# Patient Record
Sex: FEMALE | Race: WHITE | NOT HISPANIC OR LATINO | URBAN - METROPOLITAN AREA
[De-identification: names, ages, dates, MRNs, and addresses within clinical notes are randomized per-mention and may not be internally consistent; named-entity substitution may affect disease eponyms.]

---

## 2022-11-28 ENCOUNTER — APPOINTMENT (OUTPATIENT)
Dept: URBAN - METROPOLITAN AREA CLINIC 209 | Age: 67
Setting detail: DERMATOLOGY
End: 2022-11-29

## 2022-11-28 DIAGNOSIS — L20.9 ATOPIC DERMATITIS, UNSPECIFIED: ICD-10-CM

## 2022-11-28 PROBLEM — L30.9 DERMATITIS, UNSPECIFIED: Status: ACTIVE | Noted: 2022-11-28

## 2022-11-28 PROCEDURE — 99204 OFFICE O/P NEW MOD 45 MIN: CPT

## 2022-11-28 PROCEDURE — OTHER MIPS QUALITY: OTHER

## 2022-11-28 PROCEDURE — OTHER PRESCRIPTION MEDICATION MANAGEMENT: OTHER

## 2022-11-28 PROCEDURE — OTHER PRESCRIPTION: OTHER

## 2022-11-28 PROCEDURE — OTHER COUNSELING: OTHER

## 2022-11-28 PROCEDURE — OTHER DIAGNOSIS COMMENT: OTHER

## 2022-11-28 PROCEDURE — OTHER MEDICATION COUNSELING: OTHER

## 2022-11-28 RX ORDER — PERMETHRIN 50 MG/G
CREAM TOPICAL
Qty: 60 | Refills: 1 | Status: ERX | COMMUNITY
Start: 2022-11-28

## 2022-11-28 RX ORDER — PIMECROLIMUS 10 MG/G
CREAM TOPICAL
Qty: 100 | Refills: 3 | Status: ERX | COMMUNITY
Start: 2022-11-28

## 2022-11-28 RX ORDER — DOXEPIN HYDROCHLORIDE 10 MG/1
CAPSULE ORAL
Qty: 30 | Refills: 0 | Status: ERX | COMMUNITY
Start: 2022-11-28

## 2022-11-28 NOTE — PROCEDURE: PRESCRIPTION MEDICATION MANAGEMENT
Samples Given: Vanicream
Initiate Treatment: Elidel 1% cream twice daily \\ndoxepin 10 mg at bedtime for itching\\nPermethrin cream apply to neck down at night, rinse in AM, repeat in 7 days
Detail Level: Zone
Render In Strict Bullet Format?: No
Continue Regimen: Allegra daily\\nCeraVe anti-itch lotion

## 2022-11-28 NOTE — PROCEDURE: MEDICATION COUNSELING
no distress Rinvoq Counseling: I discussed with the patient the risks of Rinvoq therapy including but not limited to upper respiratory tract infections, shingles, cold sores, bronchitis, nausea, cough, fever, acne, and headache. Live vaccines should be avoided.  This medication has been linked to serious infections; higher rate of mortality; malignancy and lymphoproliferative disorders; major adverse cardiovascular events; thrombosis; thrombocytopenia, anemia, and neutropenia; lipid elevations; liver enzyme elevations; and gastrointestinal perforations.

## 2022-11-28 NOTE — PROCEDURE: DIAGNOSIS COMMENT
Comment: Discussed option of phototherapy in future if pruritus is refractory. Pt has reportedly had extensive laboratory work-up which was unrevealing. Her house is being evaluated by exterminators due to her concern about infestation.
Detail Level: Simple
Render Risk Assessment In Note?: no

## 2022-11-28 NOTE — PROCEDURE: MEDICATION COUNSELING
Xelvenuz Pregnancy And Lactation Text: This medication is Pregnancy Category D and is not considered safe during pregnancy.  The risk during breast feeding is also uncertain.

## 2022-12-27 RX ORDER — DOXEPIN HYDROCHLORIDE 10 MG/1
CAPSULE ORAL
Qty: 30 | Refills: 0 | Status: ERX

## 2023-01-19 ENCOUNTER — APPOINTMENT (OUTPATIENT)
Dept: URBAN - METROPOLITAN AREA CLINIC 262 | Age: 68
Setting detail: DERMATOLOGY
End: 2023-01-19

## 2023-01-19 DIAGNOSIS — B07.8 OTHER VIRAL WARTS: ICD-10-CM

## 2023-01-19 DIAGNOSIS — L29.8 OTHER PRURITUS: ICD-10-CM

## 2023-01-19 PROBLEM — B88.9: Status: ACTIVE | Noted: 2023-01-19

## 2023-01-19 PROCEDURE — 99203 OFFICE O/P NEW LOW 30 MIN: CPT | Mod: 25

## 2023-01-19 PROCEDURE — OTHER DEFER: OTHER

## 2023-01-19 PROCEDURE — OTHER OTHER: OTHER

## 2023-01-19 PROCEDURE — 17110 DESTRUCT B9 LESION 1-14: CPT

## 2023-01-19 PROCEDURE — OTHER PRESCRIPTION MEDICATION MANAGEMENT: OTHER

## 2023-01-19 PROCEDURE — OTHER MIPS QUALITY: OTHER

## 2023-01-19 PROCEDURE — OTHER LIQUID NITROGEN: OTHER

## 2023-01-19 PROCEDURE — OTHER PRESCRIPTION: OTHER

## 2023-01-19 PROCEDURE — OTHER COUNSELING: OTHER

## 2023-01-19 RX ORDER — LIDOCAINE 5 G/100G
CREAM RECTAL; TOPICAL
Qty: 30 | Refills: 0 | Status: ERX | COMMUNITY
Start: 2023-01-19

## 2023-01-19 RX ORDER — DOXEPIN HYDROCHLORIDE 10 MG/1
CAPSULE ORAL
Qty: 30 | Refills: 2 | Status: ERX | COMMUNITY
Start: 2023-01-19

## 2023-01-19 ASSESSMENT — LOCATION DETAILED DESCRIPTION DERM
LOCATION DETAILED: LEFT VENTRAL DISTAL FOREARM
LOCATION DETAILED: RIGHT DISTAL RADIAL THUMB

## 2023-01-19 ASSESSMENT — LOCATION SIMPLE DESCRIPTION DERM
LOCATION SIMPLE: LEFT FOREARM
LOCATION SIMPLE: RIGHT THUMB

## 2023-01-19 ASSESSMENT — LOCATION ZONE DERM
LOCATION ZONE: FINGER
LOCATION ZONE: ARM

## 2023-01-19 NOTE — PROCEDURE: DEFER
Introduction Text (Please End With A Colon): The following procedure was deferred: liquid nitrogen therapy
Detail Level: Detailed
X Size Of Lesion In Cm (Optional): 0

## 2023-01-19 NOTE — PROCEDURE: PRESCRIPTION MEDICATION MANAGEMENT
Detail Level: Zone
Render In Strict Bullet Format?: No
Initiate Treatment: 5-FU/ SA Paste from skin [employer]. Apply nightly to warts on right thumb and left forearm

## 2023-01-19 NOTE — PROCEDURE: COUNSELING
Detail Level: Detailed
Patient Specific Counseling (Will Not Stick From Patient To Patient): Patient has been counseled by other providers and is up to date with age-appropriate malignancy screening and has been evaluated previously with laboratory testing and no other obvious causes apart from documented prior infestation. Pruritus is improving, but still active.

## 2023-01-19 NOTE — PROCEDURE: OTHER
Other (Free Text): Previously concerned about arthropod infestation, because of which she has called exterminators and is cleaning her house. Associated pruritus is improved, but persistent since that episode.
Note Text (......Xxx Chief Complaint.): This diagnosis correlates with the
Detail Level: Zone
Render Risk Assessment In Note?: no

## 2023-01-19 NOTE — PROCEDURE: LIQUID NITROGEN
Render Post-Care Instructions In Note?: no
Show Aperture Variable?: Yes
Consent: The patient's consent was obtained including but not limited to risks of crusting, scabbing, blistering, scarring, darker or lighter pigmentary change, recurrence, incomplete removal and infection.
Post-Care Instructions: I reviewed with the patient in detail post-care instructions. Patient is to wear sunprotection, and avoid picking at any of the treated lesions. Pt may apply Vaseline to crusted or scabbing areas.
Spray Paint Text: The liquid nitrogen was applied to the skin utilizing a spray paint frosting technique.
Medical Necessity Clause: This procedure was medically necessary because the lesions that were treated were:
Detail Level: Detailed
Medical Necessity Information: It is in your best interest to select a reason for this procedure from the list below. All of these items fulfill various CMS LCD requirements except the new and changing color options.

## 2023-03-23 ENCOUNTER — APPOINTMENT (OUTPATIENT)
Dept: URBAN - METROPOLITAN AREA CLINIC 262 | Age: 68
Setting detail: DERMATOLOGY
End: 2023-03-23

## 2023-03-23 DIAGNOSIS — B07.8 OTHER VIRAL WARTS: ICD-10-CM

## 2023-03-23 DIAGNOSIS — L82.0 INFLAMED SEBORRHEIC KERATOSIS: ICD-10-CM

## 2023-03-23 DIAGNOSIS — I78.8 OTHER DISEASES OF CAPILLARIES: ICD-10-CM

## 2023-03-23 DIAGNOSIS — L259 CONTACT DERMATITIS AND OTHER ECZEMA, UNSPECIFIED CAUSE: ICD-10-CM

## 2023-03-23 DIAGNOSIS — T63.30 TOXIC EFFECT OF UNSPECIFIED SPIDER VENOM: ICD-10-CM

## 2023-03-23 DIAGNOSIS — B35.1 TINEA UNGUIUM: ICD-10-CM

## 2023-03-23 PROBLEM — T63.301A TOXIC EFFECT OF UNSPECIFIED SPIDER VENOM, ACCIDENTAL (UNINTENTIONAL), INITIAL ENCOUNTER: Status: ACTIVE | Noted: 2023-03-23

## 2023-03-23 PROBLEM — L23.9 ALLERGIC CONTACT DERMATITIS, UNSPECIFIED CAUSE: Status: ACTIVE | Noted: 2023-03-23

## 2023-03-23 PROCEDURE — OTHER PRESCRIPTION: OTHER

## 2023-03-23 PROCEDURE — OTHER DEFER: OTHER

## 2023-03-23 PROCEDURE — OTHER COUNSELING: OTHER

## 2023-03-23 PROCEDURE — 99214 OFFICE O/P EST MOD 30 MIN: CPT | Mod: 25

## 2023-03-23 PROCEDURE — 17110 DESTRUCT B9 LESION 1-14: CPT

## 2023-03-23 PROCEDURE — OTHER LIQUID NITROGEN: OTHER

## 2023-03-23 PROCEDURE — OTHER ADDITIONAL NOTES: OTHER

## 2023-03-23 RX ORDER — TACROLIMUS 1 MG/G
OINTMENT TOPICAL BID
Qty: 60 | Refills: 3 | Status: ERX | COMMUNITY
Start: 2023-03-23

## 2023-03-23 RX ORDER — FLUCONAZOLE 150 MG/1
TABLET ORAL
Qty: 27 | Refills: 0 | Status: ERX | COMMUNITY
Start: 2023-03-23

## 2023-03-23 ASSESSMENT — LOCATION DETAILED DESCRIPTION DERM
LOCATION DETAILED: RIGHT PLANTAR FOREFOOT OVERLYING 4TH METATARSAL
LOCATION DETAILED: LEFT DISTAL PLANTAR 5TH TOE
LOCATION DETAILED: LEFT DISTAL PRETIBIAL REGION
LOCATION DETAILED: RIGHT PROXIMAL PRETIBIAL REGION
LOCATION DETAILED: LEFT PROXIMAL PRETIBIAL REGION
LOCATION DETAILED: LEFT DISTAL PLANTAR 2ND TOE
LOCATION DETAILED: LEFT ANTERIOR MEDIAL MALLEOLUS
LOCATION DETAILED: LEFT DORSAL GREAT TOE
LOCATION DETAILED: LEFT 3RD TOENAIL
LOCATION DETAILED: LEFT DISTAL PLANTAR 4TH TOE

## 2023-03-23 ASSESSMENT — LOCATION SIMPLE DESCRIPTION DERM
LOCATION SIMPLE: LEFT 5TH TOE
LOCATION SIMPLE: LEFT ANKLE
LOCATION SIMPLE: LEFT GREAT TOE
LOCATION SIMPLE: RIGHT PLANTAR SURFACE
LOCATION SIMPLE: LEFT 4TH TOE
LOCATION SIMPLE: LEFT PRETIBIAL REGION
LOCATION SIMPLE: RIGHT PRETIBIAL REGION
LOCATION SIMPLE: LEFT 3RD TOE
LOCATION SIMPLE: LEFT 2ND TOE

## 2023-03-23 ASSESSMENT — LOCATION ZONE DERM
LOCATION ZONE: FEET
LOCATION ZONE: TOENAIL
LOCATION ZONE: LEG
LOCATION ZONE: TOE

## 2023-03-23 NOTE — PROCEDURE: COUNSELING
Detail Level: Detailed
Patient Specific Counseling (Will Not Stick From Patient To Patient): Pt concerned for contact dermatitis to class B/D topical steroids including TAC; will discontinue in favor of Protopic

## 2023-03-23 NOTE — PROCEDURE: LIQUID NITROGEN
Medical Necessity Clause: This procedure was medically necessary because the lesions that were treated were:
Spray Paint Text: The liquid nitrogen was applied to the skin utilizing a spray paint frosting technique.
Render Note In Bullet Format When Appropriate: No
Show Aperture Variable?: Yes
Consent: The patient's consent was obtained including but not limited to risks of crusting, scabbing, blistering, scarring, darker or lighter pigmentary change, recurrence, incomplete removal and infection.
Detail Level: Detailed
Medical Necessity Information: It is in your best interest to select a reason for this procedure from the list below. All of these items fulfill various CMS LCD requirements except the new and changing color options.
Post-Care Instructions: I reviewed with the patient in detail post-care instructions. Patient is to wear sunprotection, and avoid picking at any of the treated lesions. Pt may apply Vaseline to crusted or scabbing areas.

## 2023-03-23 NOTE — PROCEDURE: ADDITIONAL NOTES
Additional Notes: Recommended Protopic as well as compression stockings
Additional Notes: Diagnosed and started on doxycycline by OSH; provided with prescription samples enough to complete a full ten-day course
Render Risk Assessment In Note?: no
Detail Level: Simple

## 2023-03-23 NOTE — PROCEDURE: DEFER
Size Of Lesion In Cm (Optional): 0
Introduction Text (Please End With A Colon): Declines treatment with cryotherapy.
Detail Level: Detailed

## 2023-04-11 ENCOUNTER — RX ONLY (RX ONLY)
Age: 68
End: 2023-04-11

## 2023-04-11 RX ORDER — DOXEPIN HYDROCHLORIDE 25 MG/1
CAPSULE ORAL
Qty: 90 | Refills: 0 | Status: ERX | COMMUNITY
Start: 2023-04-11

## 2023-06-20 ENCOUNTER — APPOINTMENT (OUTPATIENT)
Dept: URBAN - METROPOLITAN AREA CLINIC 295 | Age: 68
Setting detail: DERMATOLOGY
End: 2023-06-20

## 2023-06-20 DIAGNOSIS — B07.8 OTHER VIRAL WARTS: ICD-10-CM

## 2023-06-20 DIAGNOSIS — L50.1 IDIOPATHIC URTICARIA: ICD-10-CM

## 2023-06-20 DIAGNOSIS — L20.84 INTRINSIC (ALLERGIC) ECZEMA: ICD-10-CM

## 2023-06-20 PROBLEM — L30.9 DERMATITIS, UNSPECIFIED: Status: ACTIVE | Noted: 2023-06-20

## 2023-06-20 PROCEDURE — OTHER COUNSELING: OTHER

## 2023-06-20 PROCEDURE — OTHER LIQUID NITROGEN: OTHER

## 2023-06-20 PROCEDURE — 17110 DESTRUCT B9 LESION 1-14: CPT

## 2023-06-20 PROCEDURE — OTHER PRESCRIPTION: OTHER

## 2023-06-20 PROCEDURE — 99214 OFFICE O/P EST MOD 30 MIN: CPT | Mod: 25

## 2023-06-20 PROCEDURE — OTHER ADDITIONAL NOTES: OTHER

## 2023-06-20 PROCEDURE — OTHER MIPS QUALITY: OTHER

## 2023-06-20 RX ORDER — LEVOCETIRIZINE DIHYDROCHLORIDE 5 MG/1
TABLET, FILM COATED ORAL
Qty: 90 | Refills: 2 | Status: ERX | COMMUNITY
Start: 2023-06-20

## 2023-06-20 RX ORDER — TACROLIMUS 1 MG/G
OINTMENT TOPICAL
Qty: 100 | Refills: 3 | Status: ERX | COMMUNITY
Start: 2023-06-20

## 2023-06-20 ASSESSMENT — LOCATION ZONE DERM
LOCATION ZONE: FEET
LOCATION ZONE: NECK
LOCATION ZONE: FINGER

## 2023-06-20 ASSESSMENT — LOCATION DETAILED DESCRIPTION DERM
LOCATION DETAILED: LEFT LATERAL PLANTAR HEEL
LOCATION DETAILED: RIGHT DISTAL RADIAL THUMB
LOCATION DETAILED: LEFT MEDIAL PLANTAR HEEL
LOCATION DETAILED: LEFT INFERIOR LATERAL NECK

## 2023-06-20 ASSESSMENT — LOCATION SIMPLE DESCRIPTION DERM
LOCATION SIMPLE: LEFT ANTERIOR NECK
LOCATION SIMPLE: RIGHT THUMB
LOCATION SIMPLE: LEFT PLANTAR SURFACE

## 2023-06-20 NOTE — PROCEDURE: LIQUID NITROGEN
Show Topical Anesthesia Variable?: Yes
Add 52 Modifier (Optional): no
Spray Paint Text: The liquid nitrogen was applied to the skin utilizing a spray paint frosting technique.
Medical Necessity Information: It is in your best interest to select a reason for this procedure from the list below. All of these items fulfill various CMS LCD requirements except the new and changing color options.
Medical Necessity Clause: This procedure was medically necessary because the lesions that were treated were:
Consent: The patient's consent was obtained including but not limited to risks of crusting, scabbing, blistering, scarring, darker or lighter pigmentary change, recurrence, incomplete removal and infection.
Post-Care Instructions: I reviewed with the patient in detail post-care instructions. Patient is to wear sunprotection, and avoid picking at any of the treated lesions. Pt may apply Vaseline to crusted or scabbing areas.
Detail Level: Detailed

## 2023-06-20 NOTE — PROCEDURE: ADDITIONAL NOTES
Render Risk Assessment In Note?: no
Detail Level: Simple
Additional Notes: Possibly worsened due to mites/arthropods vs airborne contact allergen from air ducts, as areas of body covered by clothing are spared from involvement, and even carrying an umbrella under her air vents seems to reduce her itching/rash. \\n\\nPatient may have contact allergy to class B and D topical steroids, as she reports that all of her rashes worsen. She also reacts adversely to prednisone.

## 2023-07-20 ENCOUNTER — APPOINTMENT (OUTPATIENT)
Dept: URBAN - METROPOLITAN AREA CLINIC 295 | Age: 68
Setting detail: DERMATOLOGY
End: 2023-07-20

## 2023-07-20 ENCOUNTER — RX ONLY (RX ONLY)
Age: 68
End: 2023-07-20

## 2023-07-20 DIAGNOSIS — L82.1 OTHER SEBORRHEIC KERATOSIS: ICD-10-CM

## 2023-07-20 DIAGNOSIS — D18.0 HEMANGIOMA: ICD-10-CM

## 2023-07-20 DIAGNOSIS — L57.8 OTHER SKIN CHANGES DUE TO CHRONIC EXPOSURE TO NONIONIZING RADIATION: ICD-10-CM

## 2023-07-20 DIAGNOSIS — B07.8 OTHER VIRAL WARTS: ICD-10-CM

## 2023-07-20 DIAGNOSIS — D22 MELANOCYTIC NEVI: ICD-10-CM

## 2023-07-20 DIAGNOSIS — Z71.89 OTHER SPECIFIED COUNSELING: ICD-10-CM

## 2023-07-20 DIAGNOSIS — L81.4 OTHER MELANIN HYPERPIGMENTATION: ICD-10-CM

## 2023-07-20 PROBLEM — D18.01 HEMANGIOMA OF SKIN AND SUBCUTANEOUS TISSUE: Status: ACTIVE | Noted: 2023-07-20

## 2023-07-20 PROBLEM — D22.5 MELANOCYTIC NEVI OF TRUNK: Status: ACTIVE | Noted: 2023-07-20

## 2023-07-20 PROCEDURE — OTHER MIPS QUALITY: OTHER

## 2023-07-20 PROCEDURE — 17110 DESTRUCT B9 LESION 1-14: CPT

## 2023-07-20 PROCEDURE — OTHER SUNSCREEN RECOMMENDATIONS: OTHER

## 2023-07-20 PROCEDURE — OTHER LIQUID NITROGEN: OTHER

## 2023-07-20 PROCEDURE — OTHER COUNSELING: OTHER

## 2023-07-20 PROCEDURE — 99213 OFFICE O/P EST LOW 20 MIN: CPT | Mod: 25

## 2023-07-20 RX ORDER — LIDOCAINE 5 G/100G
CREAM RECTAL; TOPICAL
Qty: 30 | Refills: 3 | Status: ERX | COMMUNITY
Start: 2023-07-20

## 2023-07-20 ASSESSMENT — LOCATION DETAILED DESCRIPTION DERM
LOCATION DETAILED: RIGHT PROXIMAL DORSAL THUMB
LOCATION DETAILED: EPIGASTRIC SKIN
LOCATION DETAILED: INFERIOR MID FOREHEAD
LOCATION DETAILED: LEFT MID-UPPER BACK
LOCATION DETAILED: RIGHT DISTAL DORSAL FOREARM
LOCATION DETAILED: LEFT DISTAL DORSAL FOREARM
LOCATION DETAILED: LEFT LATERAL PLANTAR HEEL

## 2023-07-20 ASSESSMENT — LOCATION SIMPLE DESCRIPTION DERM
LOCATION SIMPLE: RIGHT THUMB
LOCATION SIMPLE: RIGHT FOREARM
LOCATION SIMPLE: ABDOMEN
LOCATION SIMPLE: LEFT FOREARM
LOCATION SIMPLE: INFERIOR FOREHEAD
LOCATION SIMPLE: LEFT UPPER BACK
LOCATION SIMPLE: LEFT PLANTAR SURFACE

## 2023-07-20 ASSESSMENT — LOCATION ZONE DERM
LOCATION ZONE: TRUNK
LOCATION ZONE: FINGER
LOCATION ZONE: FEET
LOCATION ZONE: ARM
LOCATION ZONE: FACE

## 2023-07-20 NOTE — PROCEDURE: LIQUID NITROGEN
Spray Paint Technique: No
Spray Paint Text: The liquid nitrogen was applied to the skin utilizing a spray paint frosting technique.
Show Applicator Variable?: Yes
Medical Necessity Information: It is in your best interest to select a reason for this procedure from the list below. All of these items fulfill various CMS LCD requirements except the new and changing color options.
Medical Necessity Clause: This procedure was medically necessary because the lesions that were treated were:
Post-Care Instructions: I reviewed with the patient in detail post-care instructions. Patient is to wear sunprotection, and avoid picking at any of the treated lesions. Pt may apply Vaseline to crusted or scabbing areas.
Detail Level: Detailed
Consent: The patient's consent was obtained including but not limited to risks of crusting, scabbing, blistering, scarring, darker or lighter pigmentary change, recurrence, incomplete removal and infection.

## 2023-07-20 NOTE — PROCEDURE: COUNSELING
Detail Level: Generalized
Detail Level: Detailed
Sunscreen Recommendations: I recommend a broad-spectrum sunscreen with an SPF of 30 or higher. SPF 30 sunscreens block approximately 97 percent of the sun's harmful rays. Sunscreens should be applied at least 15 minutes prior to expected sun exposure and then every two hours after that at a minimum (or more frequently if it states in the packaging) as long as sun exposure continues. If swimming or exercising sunscreen should be reapplied every 45 minutes to an hour after getting wet or sweating. One ounce, or the equivalent of a shot glass full of sunscreen, is adequate to protect the skin of the entire body not covered by a bathing suit. I also recommend a lip balm with SPF 30 as well. Mineral-based sunscreens may be tolerated better in patients with sensitive skin and are recommended for anyone with a history of rosacea and/or melasma. Sun-protective clothing (rash guards, sun sleeves, gloves, sunglasses) can be used in lieu of sunscreen but must be worn the entire time you are exposed to the sun's rays.\\n\\nBrands of sunscreen that I like include CeraVe, La Roche-Posay Anthelios, Cetaphil, Vanicream, Neutrogena, Eucerin, Pipette, Blue Lizard, Elta MD, TIZO, Charan, Violet's Choice, Drunk Elephant, Supergoop!, Coola, ALIREZA, Cotz Plus, Avene, Adelina, ZO Skin Health, Obagi MD, FARRUKH, Skinceuticals

## 2023-07-20 NOTE — PROCEDURE: SUNSCREEN RECOMMENDATIONS
Detail Level: Detailed
General Sunscreen Counseling: I recommend a broad-spectrum sunscreen with a SPF of 30 or higher.  SPF 30 sunscreens block approximately 97 percent of the sun's harmful rays.  Sunscreens should be applied at least 15 minutes prior to expected sun exposure and then every two hours after that as long as sun exposure continues. If swimming or exercising sunscreen should be reapplied every 45 minutes to an hour after getting wet or sweating.  One ounce, or the equivalent of a shot glass full of sunscreen, is adequate to protect the skin not covered by a bathing suit. I also recommend a lip balm with at least SPF 30 as well. Sun protective clothing can be used in lieu of sunscreen but must be worn the entire time you are exposed to the sun's rays.\\n\\nCeraVe, La Roche-Posay Anthelios, Cetaphil, Vanicream, Pipette, Neutrogena, Blue Lizard, Elta MD, TIJORGE L, Charan, Violet's Choice, Drunk Elephant, Supergoop!, Coola, ALIREZA, Cotz Plus, ISDIN, Skinceuticals, Sente, ZO Skin Health, Obagi MD, Avene

## 2023-08-09 ENCOUNTER — RX ONLY (RX ONLY)
Age: 68
End: 2023-08-09

## 2023-08-09 RX ORDER — LIDOCAINE 50 MG/G
OINTMENT TOPICAL
Qty: 50 | Refills: 3 | Status: ERX | COMMUNITY
Start: 2023-08-09

## 2023-08-23 ENCOUNTER — APPOINTMENT (OUTPATIENT)
Dept: URBAN - METROPOLITAN AREA CLINIC 295 | Age: 68
Setting detail: DERMATOLOGY
End: 2023-08-23

## 2023-08-23 DIAGNOSIS — B07.8 OTHER VIRAL WARTS: ICD-10-CM

## 2023-08-23 DIAGNOSIS — L29.8 OTHER PRURITUS: ICD-10-CM

## 2023-08-23 DIAGNOSIS — B85.0 PEDICULOSIS DUE TO PEDICULUS HUMANUS CAPITIS: ICD-10-CM

## 2023-08-23 PROBLEM — L85.0 ACQUIRED ICHTHYOSIS: Status: ACTIVE | Noted: 2023-08-23

## 2023-08-23 PROCEDURE — OTHER LIQUID NITROGEN: OTHER

## 2023-08-23 PROCEDURE — OTHER ADDITIONAL NOTES: OTHER

## 2023-08-23 PROCEDURE — 17110 DESTRUCT B9 LESION 1-14: CPT

## 2023-08-23 PROCEDURE — OTHER COUNSELING: OTHER

## 2023-08-23 PROCEDURE — OTHER PRESCRIPTION: OTHER

## 2023-08-23 PROCEDURE — OTHER DIAGNOSIS COMMENT: OTHER

## 2023-08-23 PROCEDURE — OTHER MIPS QUALITY: OTHER

## 2023-08-23 PROCEDURE — 99214 OFFICE O/P EST MOD 30 MIN: CPT | Mod: 25

## 2023-08-23 RX ORDER — IVERMECTIN 3 MG/1
TABLET ORAL
Qty: 8 | Refills: 0 | Status: ERX | COMMUNITY
Start: 2023-08-23

## 2023-08-23 RX ORDER — LINDANE 10 MG/ML
SHAMPOO, SUSPENSION TOPICAL QWEEK
Qty: 60 | Refills: 3 | Status: ERX | COMMUNITY
Start: 2023-08-23

## 2023-08-23 ASSESSMENT — LOCATION SIMPLE DESCRIPTION DERM
LOCATION SIMPLE: LEFT PLANTAR SURFACE
LOCATION SIMPLE: RIGHT THUMB

## 2023-08-23 ASSESSMENT — LOCATION DETAILED DESCRIPTION DERM
LOCATION DETAILED: LEFT MEDIAL PLANTAR HEEL
LOCATION DETAILED: RIGHT DISTAL RADIAL THUMB

## 2023-08-23 ASSESSMENT — LOCATION ZONE DERM
LOCATION ZONE: FINGER
LOCATION ZONE: FEET

## 2023-08-23 NOTE — PROCEDURE: MIPS QUALITY
Quality 137: Melanoma: Continuity Of Care - Recall System: Patient information entered into a recall system that includes: target date for the next exam specified AND a process to follow up with patients regarding missed or unscheduled appointments
Quality 226: Preventive Care And Screening: Tobacco Use: Screening And Cessation Intervention: Patient screened for tobacco use and is an ex/non-smoker
Quality 111:Pneumonia Vaccination Status For Older Adults: Patient received any pneumococcal conjugate or polysaccharide vaccine on or after their 60th birthday and before the end of the measurement period
Quality 130: Documentation Of Current Medications In The Medical Record: Current Medications Documented
Detail Level: Detailed
Quality 431: Preventive Care And Screening: Unhealthy Alcohol Use - Screening: Patient not identified as an unhealthy alcohol user when screened for unhealthy alcohol use using a systematic screening method
Quality 110: Preventive Care And Screening: Influenza Immunization: Influenza Immunization Administered during Influenza season

## 2023-08-23 NOTE — PROCEDURE: DIAGNOSIS COMMENT
Detail Level: Simple
Render Risk Assessment In Note?: no
Comment: Not on any causative medications and no lymphadenopathy. Not taking any supplements likely to cause this presentation. Patient has had lab work and imaging ordered by PCP (CT scan tomorrow).

## 2023-08-23 NOTE — PROCEDURE: ADDITIONAL NOTES
Detail Level: Simple
Additional Notes: Her house was treated for dust mites and carpet beetles (vents and carpets replaced and  called), which has helped, but itch is persistent. She is seeing allergy with plans to start prednisone to clear skin/itch, after which she will receive prick testing for environmental allergens. \\n\\nPatient had CXR, echocardiogram and labs by PCP. She is getting a CT scan ordered by her PCP, all within normal limits. If evaluation does not yield any obvious cause, will refer to Dr. Avina' office for narrow-band UVB phototherapy.\\n\\nFound to have head lice on microscopic examination of scalp sample today. This does not explain itching on torso and extremities.
Render Risk Assessment In Note?: no

## 2023-08-23 NOTE — PROCEDURE: LIQUID NITROGEN
Show Aperture Variable?: Yes
Add 52 Modifier (Optional): no
Duration Of Freeze Thaw-Cycle (Seconds): 5-10
Consent: The patient's consent was obtained including but not limited to risks of crusting, scabbing, blistering, scarring, darker or lighter pigmentary change, recurrence, incomplete removal and infection.
Medical Necessity Clause: This procedure was medically necessary because the lesions that were treated were:
Number Of Freeze-Thaw Cycles: 3 freeze-thaw cycles
Post-Care Instructions: I reviewed with the patient in detail post-care instructions. Patient is to wear sunprotection, and avoid picking at any of the treated lesions. Pt may apply Vaseline to crusted or scabbing areas.
Pared With?: curette
Application Tool (Optional): Liquid Nitrogen Sprayer
Medical Necessity Information: It is in your best interest to select a reason for this procedure from the list below. All of these items fulfill various CMS LCD requirements except the new and changing color options.
Detail Level: Detailed
Spray Paint Text: The liquid nitrogen was applied to the skin utilizing a spray paint frosting technique.

## 2023-08-23 NOTE — HPI: EVALUATION OF SKIN LESION(S)
What Type Of Note Output Would You Prefer (Optional)?: Bullet Format
Hpi Title: Evaluation of Skin Lesions
Additional History: Patient complains of itchiness distributed throughout trunk.
Location: Calf
Year Removed: 2008

## 2023-09-19 ENCOUNTER — APPOINTMENT (OUTPATIENT)
Dept: URBAN - METROPOLITAN AREA CLINIC 295 | Age: 68
Setting detail: DERMATOLOGY
End: 2023-09-19

## 2023-09-19 DIAGNOSIS — L20.89 OTHER ATOPIC DERMATITIS: ICD-10-CM

## 2023-09-19 DIAGNOSIS — B07.8 OTHER VIRAL WARTS: ICD-10-CM

## 2023-09-19 DIAGNOSIS — L29.8 OTHER PRURITUS: ICD-10-CM

## 2023-09-19 DIAGNOSIS — L08.9 LOCAL INFECTION OF THE SKIN AND SUBCUTANEOUS TISSUE, UNSPECIFIED: ICD-10-CM

## 2023-09-19 DIAGNOSIS — T07XXXA INSECT BITE, NONVENOMOUS, OF OTHER, MULTIPLE, AND UNSPECIFIED SITES, WITHOUT MENTION OF INFECTION: ICD-10-CM

## 2023-09-19 PROBLEM — T07.XXXA UNSPECIFIED MULTIPLE INJURIES, INITIAL ENCOUNTER: Status: ACTIVE | Noted: 2023-09-19

## 2023-09-19 PROCEDURE — OTHER COUNSELING: OTHER

## 2023-09-19 PROCEDURE — 99214 OFFICE O/P EST MOD 30 MIN: CPT | Mod: 25

## 2023-09-19 PROCEDURE — OTHER LIQUID NITROGEN: OTHER

## 2023-09-19 PROCEDURE — 17110 DESTRUCT B9 LESION 1-14: CPT

## 2023-09-19 PROCEDURE — OTHER PRESCRIPTION: OTHER

## 2023-09-19 PROCEDURE — OTHER DIAGNOSIS COMMENT: OTHER

## 2023-09-19 PROCEDURE — OTHER TREATMENT REGIMEN: OTHER

## 2023-09-19 RX ORDER — DOXYCYCLINE HYCLATE 100 MG/1
CAPSULE, GELATIN COATED ORAL
Qty: 20 | Refills: 0 | Status: ERX | COMMUNITY
Start: 2023-09-19

## 2023-09-19 ASSESSMENT — LOCATION ZONE DERM
LOCATION ZONE: FEET
LOCATION ZONE: TRUNK
LOCATION ZONE: FINGER

## 2023-09-19 ASSESSMENT — LOCATION DETAILED DESCRIPTION DERM
LOCATION DETAILED: LEFT MEDIAL PLANTAR HEEL
LOCATION DETAILED: LEFT RIB CAGE
LOCATION DETAILED: LEFT LATERAL SUPERIOR CHEST
LOCATION DETAILED: PERIUMBILICAL SKIN
LOCATION DETAILED: RIGHT RIB CAGE
LOCATION DETAILED: RIGHT DISTAL RADIAL THUMB

## 2023-09-19 ASSESSMENT — LOCATION SIMPLE DESCRIPTION DERM
LOCATION SIMPLE: LEFT PLANTAR SURFACE
LOCATION SIMPLE: RIGHT THUMB
LOCATION SIMPLE: CHEST
LOCATION SIMPLE: ABDOMEN

## 2023-09-19 NOTE — PROCEDURE: DIAGNOSIS COMMENT
Comment: Patient has proven presence of carpet beetles in her house and mites in the ducts, which improved some of her symptoms, but she reports that they persist. Treated for head lice at last visit after found to have lice on microscopic exam of specimen obtained from scalp. Found incidentally to have a flea on sample from foot under the microscope, which may or may not be contributing to symptoms.
Detail Level: Simple
Render Risk Assessment In Note?: no
Comment: Status post extensive workup by PCP. CT scans negative for malignancy. Scheduled for mammogram in November. Status post prick testing with no obvious causative allergens found. Patient to talk to allergy about patch testing. Will consider treatment with Dupixent given severe itch with presence of eczema.

## 2023-09-19 NOTE — PROCEDURE: LIQUID NITROGEN
Detail Level: Detailed
Add 52 Modifier (Optional): no
Application Tool (Optional): Liquid Nitrogen Sprayer
Medical Necessity Information: It is in your best interest to select a reason for this procedure from the list below. All of these items fulfill various CMS LCD requirements except the new and changing color options.
Spray Paint Text: The liquid nitrogen was applied to the skin utilizing a spray paint frosting technique.
Show Aperture Variable?: Yes
Consent: The patient's consent was obtained including but not limited to risks of crusting, scabbing, blistering, scarring, darker or lighter pigmentary change, recurrence, incomplete removal and infection.
Duration Of Freeze Thaw-Cycle (Seconds): 5-10
Number Of Freeze-Thaw Cycles: 3 freeze-thaw cycles
Medical Necessity Clause: This procedure was medically necessary because the lesions that were treated were:
Post-Care Instructions: I reviewed with the patient in detail post-care instructions. Patient is to wear sunprotection, and avoid picking at any of the treated lesions. Pt may apply Vaseline to crusted or scabbing areas.
Pared With?: curette

## 2023-09-20 ENCOUNTER — RX ONLY (RX ONLY)
Age: 68
End: 2023-09-20

## 2023-09-20 RX ORDER — DOXYCYCLINE HYCLATE 100 MG/1
TABLET, COATED ORAL
Qty: 20 | Refills: 0 | Status: ERX | COMMUNITY
Start: 2023-09-20

## 2023-10-02 ENCOUNTER — RX ONLY (RX ONLY)
Age: 68
End: 2023-10-02

## 2023-10-02 RX ORDER — DOXYCYCLINE HYCLATE 100 MG/1
TABLET, COATED ORAL
Qty: 20 | Refills: 0 | Status: CANCELLED
Stop reason: CLARIF

## 2023-10-02 RX ORDER — TACROLIMUS 1 MG/G
OINTMENT TOPICAL
Qty: 100 | Refills: 3 | Status: ERX

## 2023-10-02 RX ORDER — TACROLIMUS 1 MG/G
OINTMENT TOPICAL
Qty: 100 | Refills: 3 | Status: CANCELLED

## 2023-10-09 ENCOUNTER — RX ONLY (RX ONLY)
Age: 68
End: 2023-10-09

## 2023-10-09 RX ORDER — TACROLIMUS 1 MG/G
OINTMENT TOPICAL
Qty: 100 | Refills: 3 | Status: ERX

## 2023-10-30 ENCOUNTER — APPOINTMENT (OUTPATIENT)
Dept: URBAN - METROPOLITAN AREA CLINIC 295 | Age: 68
Setting detail: DERMATOLOGY
End: 2023-10-31

## 2023-10-30 DIAGNOSIS — L259 CONTACT DERMATITIS AND OTHER ECZEMA, UNSPECIFIED CAUSE: ICD-10-CM

## 2023-10-30 DIAGNOSIS — B07.8 OTHER VIRAL WARTS: ICD-10-CM

## 2023-10-30 PROBLEM — L23.9 ALLERGIC CONTACT DERMATITIS, UNSPECIFIED CAUSE: Status: ACTIVE | Noted: 2023-10-30

## 2023-10-30 PROCEDURE — OTHER COUNSELING: OTHER

## 2023-10-30 PROCEDURE — 99213 OFFICE O/P EST LOW 20 MIN: CPT | Mod: 25

## 2023-10-30 PROCEDURE — OTHER MIPS QUALITY: OTHER

## 2023-10-30 PROCEDURE — OTHER DIAGNOSIS COMMENT: OTHER

## 2023-10-30 PROCEDURE — OTHER LIQUID NITROGEN: OTHER

## 2023-10-30 PROCEDURE — 17110 DESTRUCT B9 LESION 1-14: CPT

## 2023-10-30 ASSESSMENT — LOCATION DETAILED DESCRIPTION DERM
LOCATION DETAILED: LEFT LATERAL PLANTAR HEEL
LOCATION DETAILED: PERIUNGUAL SKIN RIGHT THUMB

## 2023-10-30 ASSESSMENT — LOCATION ZONE DERM
LOCATION ZONE: FEET
LOCATION ZONE: FINGER

## 2023-10-30 ASSESSMENT — LOCATION SIMPLE DESCRIPTION DERM
LOCATION SIMPLE: LEFT PLANTAR SURFACE
LOCATION SIMPLE: RIGHT THUMB

## 2023-10-30 NOTE — PROCEDURE: MIPS QUALITY
Name And Contact Information For Health Care Proxy: Chaim Hagen (): [phone] Name And Contact Information For Health Care Proxy: Chaim Hagen (): [phone]

## 2023-10-30 NOTE — PROCEDURE: LIQUID NITROGEN
Consent: The patient's consent was obtained including but not limited to risks of crusting, scabbing, blistering, scarring, darker or lighter pigmentary change, recurrence, incomplete removal and infection.
Render Note In Bullet Format When Appropriate: No
Show Topical Anesthesia Variable?: Yes
Spray Paint Text: The liquid nitrogen was applied to the skin utilizing a spray paint frosting technique.
Application Tool (Optional): Liquid Nitrogen Sprayer
Post-Care Instructions: I reviewed with the patient in detail post-care instructions. Patient is to wear sunprotection, and avoid picking at any of the treated lesions. Pt may apply Vaseline to crusted or scabbing areas.
Duration Of Freeze Thaw-Cycle (Seconds): 5-10
Detail Level: Detailed
Number Of Freeze-Thaw Cycles: 3 freeze-thaw cycles
Medical Necessity Information: It is in your best interest to select a reason for this procedure from the list below. All of these items fulfill various CMS LCD requirements except the new and changing color options.
Pared With?: curette
Medical Necessity Clause: This procedure was medically necessary because the lesions that were treated were:

## 2023-10-30 NOTE — PROCEDURE: DIAGNOSIS COMMENT
Render Risk Assessment In Note?: no
Detail Level: Simple
Comment: Strong positive to nickel and weak positive to iodopropynyl butylcarbamate; irritant to polyester, neomycin, formaldehyde, quaternium-15

## 2023-11-30 ENCOUNTER — RX ONLY (RX ONLY)
Age: 68
End: 2023-11-30

## 2023-11-30 ENCOUNTER — APPOINTMENT (OUTPATIENT)
Dept: URBAN - METROPOLITAN AREA CLINIC 295 | Age: 68
Setting detail: DERMATOLOGY
End: 2023-11-30

## 2023-11-30 DIAGNOSIS — B07.8 OTHER VIRAL WARTS: ICD-10-CM

## 2023-11-30 DIAGNOSIS — L20.89 OTHER ATOPIC DERMATITIS: ICD-10-CM

## 2023-11-30 DIAGNOSIS — F45.8 OTHER SOMATOFORM DISORDERS: ICD-10-CM

## 2023-11-30 PROBLEM — L20.9 ATOPIC DERMATITIS, UNSPECIFIED: Status: ACTIVE | Noted: 2023-11-30

## 2023-11-30 PROCEDURE — OTHER PRESCRIPTION: OTHER

## 2023-11-30 PROCEDURE — 17110 DESTRUCT B9 LESION 1-14: CPT

## 2023-11-30 PROCEDURE — OTHER PRESCRIPTION MEDICATION MANAGEMENT: OTHER

## 2023-11-30 PROCEDURE — OTHER MIPS QUALITY: OTHER

## 2023-11-30 PROCEDURE — OTHER DIAGNOSIS COMMENT: OTHER

## 2023-11-30 PROCEDURE — OTHER LIQUID NITROGEN: OTHER

## 2023-11-30 PROCEDURE — 99214 OFFICE O/P EST MOD 30 MIN: CPT | Mod: 25

## 2023-11-30 PROCEDURE — OTHER COUNSELING: OTHER

## 2023-11-30 RX ORDER — DUPILUMAB 300 MG/2ML
INJECTION, SOLUTION SUBCUTANEOUS
Qty: 2 | Refills: 11 | Status: ERX | COMMUNITY
Start: 2023-11-30

## 2023-11-30 RX ORDER — EMOLLIENT COMBINATION NO.32
EMULSION, EXTENDED RELEASE TOPICAL
Qty: 225 | Refills: 5 | Status: ERX | COMMUNITY
Start: 2023-11-30

## 2023-11-30 RX ORDER — DUPILUMAB 300 MG/2ML
INJECTION, SOLUTION SUBCUTANEOUS
Qty: 2 | Refills: 0 | Status: ERX

## 2023-11-30 ASSESSMENT — LOCATION ZONE DERM
LOCATION ZONE: TRUNK
LOCATION ZONE: FEET
LOCATION ZONE: FINGER

## 2023-11-30 ASSESSMENT — LOCATION SIMPLE DESCRIPTION DERM
LOCATION SIMPLE: RIGHT PLANTAR SURFACE
LOCATION SIMPLE: RIGHT THUMB
LOCATION SIMPLE: LEFT UPPER BACK

## 2023-11-30 ASSESSMENT — ITCH NUMERIC RATING SCALE: HOW SEVERE IS YOUR ITCHING?: 7

## 2023-11-30 ASSESSMENT — LOCATION DETAILED DESCRIPTION DERM
LOCATION DETAILED: RIGHT DISTAL RADIAL THUMB
LOCATION DETAILED: RIGHT LATERAL PLANTAR HEEL
LOCATION DETAILED: LEFT INFERIOR UPPER BACK

## 2023-11-30 ASSESSMENT — BSA ECZEMA: % BODY COVERED IN ECZEMA: 15

## 2023-11-30 ASSESSMENT — SEVERITY ASSESSMENT 2020: SEVERITY 2020: SEVERE

## 2023-11-30 NOTE — PROCEDURE: LIQUID NITROGEN
Show Spray Paint Technique Variable?: Yes
Render Post-Care Instructions In Note?: no
Detail Level: Detailed
Application Tool (Optional): Liquid Nitrogen Sprayer
Spray Paint Text: The liquid nitrogen was applied to the skin utilizing a spray paint frosting technique.
Medical Necessity Information: It is in your best interest to select a reason for this procedure from the list below. All of these items fulfill various CMS LCD requirements except the new and changing color options.
Consent: The patient's consent was obtained including but not limited to risks of crusting, scabbing, blistering, scarring, darker or lighter pigmentary change, recurrence, incomplete removal and infection.
Post-Care Instructions: I reviewed with the patient in detail post-care instructions. Patient is to wear sunprotection, and avoid picking at any of the treated lesions. Pt may apply Vaseline to crusted or scabbing areas.
Medical Necessity Clause: This procedure was medically necessary because the lesions that were treated were:
Number Of Freeze-Thaw Cycles: 3 freeze-thaw cycles

## 2023-11-30 NOTE — PROCEDURE: PRESCRIPTION MEDICATION MANAGEMENT
Render In Strict Bullet Format?: No
Continue Regimen: Tacrolimus ointment
Detail Level: Zone
Initiate Treatment: Dupixent\\nEpiCeram

## 2023-11-30 NOTE — PROCEDURE: DIAGNOSIS COMMENT
Detail Level: Generalized
Render Risk Assessment In Note?: no
Comment: Allergy to class B and D topical steroids. Only tolerates tacrolimus ointment, which is expensive for her. She is having to use this over large body surface area. Status post patch testing (which was strong positive to nickel and weak positive to iodopropynyl butylcarbamate; irritant to polyester, neomycin, formaldehyde, quaternium-15) but rashes have not improved significantly following avoidance of contact allergens. She has been treated for infestations and reports persistent itch and has rashes that is interfering with sleep and daily functioning.

## 2023-11-30 NOTE — PROCEDURE: MIPS QUALITY
Quality 110: Preventive Care And Screening: Influenza Immunization: Influenza Immunization Administered during Influenza season
Quality 47: Advance Care Plan: Advance Care Planning discussed and documented; advance care plan or surrogate decision maker documented in the medical record.
Quality 431: Preventive Care And Screening: Unhealthy Alcohol Use - Screening: Patient not identified as an unhealthy alcohol user when screened for unhealthy alcohol use using a systematic screening method
Quality 226: Preventive Care And Screening: Tobacco Use: Screening And Cessation Intervention: Patient screened for tobacco use and is an ex/non-smoker
Detail Level: Detailed
Quality 130: Documentation Of Current Medications In The Medical Record: Current Medications Documented
Name And Contact Information For Health Care Proxy: Chaim Hagen (): [phone]

## 2023-11-30 NOTE — PROCEDURE: COUNSELING
Detail Level: Zone
Detail Level: Detailed
Patient Specific Counseling (Will Not Stick From Patient To Patient): Patient has likely allergy to dust mites and reports carpet beetle infestation confirmed by insect removal specialists. She has replaced carpeting and duct work and reports improvement but some persistence of symptoms.

## 2023-12-07 ENCOUNTER — RX ONLY (RX ONLY)
Age: 68
End: 2023-12-07

## 2023-12-07 RX ORDER — DUPILUMAB 300 MG/2ML
INJECTION, SOLUTION SUBCUTANEOUS
Qty: 2 | Refills: 11 | Status: ERX

## 2023-12-07 RX ORDER — DUPILUMAB 300 MG/2ML
INJECTION, SOLUTION SUBCUTANEOUS
Qty: 2 | Refills: 0 | Status: ERX

## 2024-01-16 ENCOUNTER — APPOINTMENT (OUTPATIENT)
Dept: URBAN - METROPOLITAN AREA CLINIC 295 | Age: 69
Setting detail: DERMATOLOGY
End: 2024-01-19

## 2024-01-16 DIAGNOSIS — B07.8 OTHER VIRAL WARTS: ICD-10-CM

## 2024-01-16 DIAGNOSIS — L259 CONTACT DERMATITIS AND OTHER ECZEMA, UNSPECIFIED CAUSE: ICD-10-CM

## 2024-01-16 DIAGNOSIS — L20.89 OTHER ATOPIC DERMATITIS: ICD-10-CM

## 2024-01-16 PROBLEM — L23.0 ALLERGIC CONTACT DERMATITIS DUE TO METALS: Status: ACTIVE | Noted: 2024-01-16

## 2024-01-16 PROCEDURE — OTHER LIQUID NITROGEN: OTHER

## 2024-01-16 PROCEDURE — 99214 OFFICE O/P EST MOD 30 MIN: CPT | Mod: 25

## 2024-01-16 PROCEDURE — OTHER COUNSELING: OTHER

## 2024-01-16 PROCEDURE — OTHER MIPS QUALITY: OTHER

## 2024-01-16 PROCEDURE — OTHER TREATMENT REGIMEN: OTHER

## 2024-01-16 PROCEDURE — 17110 DESTRUCT B9 LESION 1-14: CPT

## 2024-01-16 ASSESSMENT — BSA ECZEMA: % BODY COVERED IN ECZEMA: 6

## 2024-01-16 ASSESSMENT — LOCATION DETAILED DESCRIPTION DERM
LOCATION DETAILED: PERIUNGUAL SKIN RIGHT THUMB
LOCATION DETAILED: LEFT ANTERIOR DISTAL UPPER ARM
LOCATION DETAILED: EPIGASTRIC SKIN
LOCATION DETAILED: RIGHT PROXIMAL PRETIBIAL REGION
LOCATION DETAILED: RIGHT MEDIAL PLANTAR HEEL
LOCATION DETAILED: RIGHT ANTERIOR DISTAL UPPER ARM
LOCATION DETAILED: LEFT PROXIMAL PRETIBIAL REGION

## 2024-01-16 ASSESSMENT — LOCATION ZONE DERM
LOCATION ZONE: LEG
LOCATION ZONE: ARM
LOCATION ZONE: FEET
LOCATION ZONE: FINGER
LOCATION ZONE: TRUNK

## 2024-01-16 ASSESSMENT — LOCATION SIMPLE DESCRIPTION DERM
LOCATION SIMPLE: RIGHT PRETIBIAL REGION
LOCATION SIMPLE: ABDOMEN
LOCATION SIMPLE: RIGHT PLANTAR SURFACE
LOCATION SIMPLE: RIGHT THUMB
LOCATION SIMPLE: RIGHT UPPER ARM
LOCATION SIMPLE: LEFT PRETIBIAL REGION
LOCATION SIMPLE: LEFT UPPER ARM

## 2024-01-16 ASSESSMENT — ITCH NUMERIC RATING SCALE: HOW SEVERE IS YOUR ITCHING?: 7

## 2024-01-16 ASSESSMENT — SEVERITY ASSESSMENT 2020: SEVERITY 2020: MODERATE

## 2024-01-16 NOTE — PROCEDURE: TREATMENT REGIMEN
Detail Level: Zone
Discontinue Regimen: Dupixent (patient uncomfortable with a systemic agent)
Plan: Consider Eucrisa if not improved at next visit
Continue Regimen: EpiCeram\\nTacrolimus ointment\\nAntihistamines
Plan: Avoid contact allergens as confirmed by extended-panel patch testing, including nickel-free diet

## 2024-01-16 NOTE — PROCEDURE: MIPS QUALITY
Quality 110: Preventive Care And Screening: Influenza Immunization: Influenza Immunization Administered during Influenza season
Quality 47: Advance Care Plan: Advance Care Planning discussed and documented; advance care plan or surrogate decision maker documented in the medical record.
Quality 431: Preventive Care And Screening: Unhealthy Alcohol Use - Screening: Patient not identified as an unhealthy alcohol user when screened for unhealthy alcohol use using a systematic screening method
Quality 226: Preventive Care And Screening: Tobacco Use: Screening And Cessation Intervention: Patient screened for tobacco use and is an ex/non-smoker
Detail Level: Detailed
Quality 130: Documentation Of Current Medications In The Medical Record: Current Medications Documented
Name And Contact Information For Health Care Proxy: Chaim Hagen (): [phone]

## 2024-01-16 NOTE — PROCEDURE: LIQUID NITROGEN
Post-Care Instructions: I reviewed with the patient in detail post-care instructions. Patient is to wear sunprotection, and avoid picking at any of the treated lesions. Pt may apply Vaseline to crusted or scabbing areas.
Consent: The patient's consent was obtained including but not limited to risks of crusting, scabbing, blistering, scarring, darker or lighter pigmentary change, recurrence, incomplete removal and infection.
Show Spray Paint Technique Variable?: Yes
Render Note In Bullet Format When Appropriate: No
Medical Necessity Clause: This procedure was medically necessary because the lesions that were treated were:
Number Of Freeze-Thaw Cycles: 1 freeze-thaw cycle
Medical Necessity Information: It is in your best interest to select a reason for this procedure from the list below. All of these items fulfill various CMS LCD requirements except the new and changing color options.
Application Tool (Optional): Liquid Nitrogen Sprayer
Detail Level: Detailed
Spray Paint Text: The liquid nitrogen was applied to the skin utilizing a spray paint frosting technique.

## 2024-02-19 ENCOUNTER — APPOINTMENT (OUTPATIENT)
Dept: URBAN - METROPOLITAN AREA CLINIC 295 | Age: 69
Setting detail: DERMATOLOGY
End: 2024-02-19

## 2024-02-19 DIAGNOSIS — Q80.8 OTHER CONGENITAL ICHTHYOSIS: ICD-10-CM

## 2024-02-19 DIAGNOSIS — B07.8 OTHER VIRAL WARTS: ICD-10-CM

## 2024-02-19 DIAGNOSIS — L20.89 OTHER ATOPIC DERMATITIS: ICD-10-CM

## 2024-02-19 DIAGNOSIS — L259 CONTACT DERMATITIS AND OTHER ECZEMA, UNSPECIFIED CAUSE: ICD-10-CM

## 2024-02-19 PROBLEM — L23.0 ALLERGIC CONTACT DERMATITIS DUE TO METALS: Status: ACTIVE | Noted: 2024-02-19

## 2024-02-19 PROCEDURE — OTHER COUNSELING: OTHER

## 2024-02-19 PROCEDURE — 17110 DESTRUCT B9 LESION 1-14: CPT

## 2024-02-19 PROCEDURE — OTHER TREATMENT REGIMEN: OTHER

## 2024-02-19 PROCEDURE — OTHER ADDITIONAL NOTES: OTHER

## 2024-02-19 PROCEDURE — OTHER DIAGNOSIS COMMENT: OTHER

## 2024-02-19 PROCEDURE — OTHER LIQUID NITROGEN: OTHER

## 2024-02-19 PROCEDURE — 99213 OFFICE O/P EST LOW 20 MIN: CPT | Mod: 25

## 2024-02-19 PROCEDURE — OTHER MIPS QUALITY: OTHER

## 2024-02-19 ASSESSMENT — LOCATION ZONE DERM
LOCATION ZONE: ARM
LOCATION ZONE: LEG
LOCATION ZONE: FINGER
LOCATION ZONE: FEET
LOCATION ZONE: TRUNK

## 2024-02-19 ASSESSMENT — LOCATION DETAILED DESCRIPTION DERM
LOCATION DETAILED: RIGHT ANTERIOR DISTAL UPPER ARM
LOCATION DETAILED: LEFT PROXIMAL PRETIBIAL REGION
LOCATION DETAILED: RIGHT MEDIAL PLANTAR HEEL
LOCATION DETAILED: EPIGASTRIC SKIN
LOCATION DETAILED: PERIUMBILICAL SKIN
LOCATION DETAILED: LEFT ANTERIOR DISTAL UPPER ARM
LOCATION DETAILED: RIGHT LATERAL PLANTAR HEEL
LOCATION DETAILED: RIGHT PROXIMAL PRETIBIAL REGION
LOCATION DETAILED: PERIUNGUAL SKIN RIGHT THUMB

## 2024-02-19 ASSESSMENT — LOCATION SIMPLE DESCRIPTION DERM
LOCATION SIMPLE: RIGHT UPPER ARM
LOCATION SIMPLE: LEFT PRETIBIAL REGION
LOCATION SIMPLE: RIGHT PLANTAR SURFACE
LOCATION SIMPLE: RIGHT THUMB
LOCATION SIMPLE: LEFT UPPER ARM
LOCATION SIMPLE: ABDOMEN
LOCATION SIMPLE: RIGHT PRETIBIAL REGION

## 2024-02-19 ASSESSMENT — ITCH NUMERIC RATING SCALE: HOW SEVERE IS YOUR ITCHING?: 4

## 2024-02-19 ASSESSMENT — SEVERITY ASSESSMENT 2020: SEVERITY 2020: MODERATE

## 2024-02-19 ASSESSMENT — BSA ECZEMA: % BODY COVERED IN ECZEMA: 6

## 2024-02-19 NOTE — PROCEDURE: MIPS QUALITY
Quality 110: Preventive Care And Screening: Influenza Immunization: Influenza Immunization Administered during Influenza season
Quality 47: Advance Care Plan: Advance Care Planning discussed and documented; advance care plan or surrogate decision maker documented in the medical record.
Quality 431: Preventive Care And Screening: Unhealthy Alcohol Use - Screening: Patient not identified as an unhealthy alcohol user when screened for unhealthy alcohol use using a systematic screening method
Quality 226: Preventive Care And Screening: Tobacco Use: Screening And Cessation Intervention: Patient screened for tobacco use and is an ex/non-smoker
Detail Level: Detailed
Quality 130: Documentation Of Current Medications In The Medical Record: Current Medications Documented
Name And Contact Information For Health Care Proxy: Chaim Hagen (): [phone]

## 2024-02-19 NOTE — PROCEDURE: TREATMENT REGIMEN
Detail Level: Zone
Plan: Consider Eucrisa if not improved at next visit
Continue Regimen: EpiCeram\\nTacrolimus ointment\\nAntihistamines
Plan: Avoid contact allergens as confirmed by extended-panel patch testing, including nickel-free diet

## 2024-02-19 NOTE — PROCEDURE: DIAGNOSIS COMMENT
Render Risk Assessment In Note?: no
Comment: Patient reports that lab testing revealed elevated levels of parathyroid hormone. Patient is going to have an ultrasound scheduled for further assessment. 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
Detail Level: Simple

## 2024-02-19 NOTE — PROCEDURE: ADDITIONAL NOTES
Detail Level: Simple
Additional Notes: Patient has ammonium lactate lotion at home already. She did not like using it before, but she is amenable to a trial of ammonium lactate lotion applied to the affected areas.
Render Risk Assessment In Note?: no

## 2024-03-20 ENCOUNTER — RX ONLY (RX ONLY)
Age: 69
End: 2024-03-20

## 2024-03-20 ENCOUNTER — APPOINTMENT (OUTPATIENT)
Dept: URBAN - METROPOLITAN AREA CLINIC 295 | Age: 69
Setting detail: DERMATOLOGY
End: 2024-03-21

## 2024-03-20 DIAGNOSIS — B07.8 OTHER VIRAL WARTS: ICD-10-CM

## 2024-03-20 DIAGNOSIS — L21.8 OTHER SEBORRHEIC DERMATITIS: ICD-10-CM

## 2024-03-20 DIAGNOSIS — Q80.8 OTHER CONGENITAL ICHTHYOSIS: ICD-10-CM

## 2024-03-20 DIAGNOSIS — L29.8 OTHER PRURITUS: ICD-10-CM

## 2024-03-20 DIAGNOSIS — B85.1 PEDICULOSIS DUE TO PEDICULUS HUMANUS CORPORIS: ICD-10-CM

## 2024-03-20 PROCEDURE — OTHER TREATMENT REGIMEN: OTHER

## 2024-03-20 PROCEDURE — OTHER LIQUID NITROGEN: OTHER

## 2024-03-20 PROCEDURE — 99214 OFFICE O/P EST MOD 30 MIN: CPT | Mod: 25

## 2024-03-20 PROCEDURE — OTHER COUNSELING: OTHER

## 2024-03-20 PROCEDURE — OTHER MIPS QUALITY: OTHER

## 2024-03-20 PROCEDURE — OTHER DIAGNOSIS COMMENT: OTHER

## 2024-03-20 PROCEDURE — 17110 DESTRUCT B9 LESION 1-14: CPT

## 2024-03-20 PROCEDURE — OTHER PRESCRIPTION: OTHER

## 2024-03-20 RX ORDER — IVERMECTIN 3 MG/1
TABLET ORAL
Qty: 8 | Refills: 0 | Status: CANCELLED
Stop reason: CLARIF

## 2024-03-20 RX ORDER — IVERMECTIN 3 MG/1
TABLET ORAL
Qty: 8 | Refills: 0 | Status: ERX

## 2024-03-20 RX ORDER — LIDOCAINE 5 G/100G
CREAM RECTAL; TOPICAL
Qty: 30 | Refills: 3 | Status: ERX

## 2024-03-20 RX ORDER — IVERMECTIN 3 MG/1
TABLET ORAL
Qty: 8 | Refills: 0 | Status: ERX | COMMUNITY
Start: 2024-03-20

## 2024-03-20 ASSESSMENT — LOCATION SIMPLE DESCRIPTION DERM
LOCATION SIMPLE: RIGHT PLANTAR SURFACE
LOCATION SIMPLE: RIGHT THUMB

## 2024-03-20 ASSESSMENT — LOCATION DETAILED DESCRIPTION DERM
LOCATION DETAILED: RIGHT PLANTAR FOREFOOT OVERLYING 2ND METATARSAL
LOCATION DETAILED: PERIUNGUAL SKIN RIGHT THUMB
LOCATION DETAILED: RIGHT MEDIAL PLANTAR HEEL

## 2024-03-20 ASSESSMENT — LOCATION ZONE DERM
LOCATION ZONE: FEET
LOCATION ZONE: FINGER

## 2024-03-20 NOTE — PROCEDURE: LIQUID NITROGEN
Post-Care Instructions: I reviewed with the patient in detail post-care instructions. Patient is to wear sunprotection, and avoid picking at any of the treated lesions. Pt may apply Vaseline to crusted or scabbing areas.
Medical Necessity Information: It is in your best interest to select a reason for this procedure from the list below. All of these items fulfill various CMS LCD requirements except the new and changing color options.
Show Spray Paint Technique Variable?: Yes
Consent: The patient's consent was obtained including but not limited to risks of crusting, scabbing, blistering, scarring, darker or lighter pigmentary change, recurrence, incomplete removal and infection.
Number Of Freeze-Thaw Cycles: 1 freeze-thaw cycle
Medical Necessity Clause: This procedure was medically necessary because the lesions that were treated were:
Render Note In Bullet Format When Appropriate: No
Application Tool (Optional): Liquid Nitrogen Sprayer
Detail Level: Detailed
Spray Paint Text: The liquid nitrogen was applied to the skin utilizing a spray paint frosting technique.

## 2024-03-20 NOTE — PROCEDURE: MIPS QUALITY
Quality 110: Preventive Care And Screening: Influenza Immunization: Influenza Immunization Administered during Influenza season
Quality 47: Advance Care Plan: Advance Care Planning discussed and documented; advance care plan or surrogate decision maker documented in the medical record.
Quality 431: Preventive Care And Screening: Unhealthy Alcohol Use - Screening: Patient not identified as an unhealthy alcohol user when screened for unhealthy alcohol use using a systematic screening method
Quality 226: Preventive Care And Screening: Tobacco Use: Screening And Cessation Intervention: Patient screened for tobacco use and is an ex/non-smoker
Detail Level: Detailed
Quality 130: Documentation Of Current Medications In The Medical Record: Current Medications Documented
Name And Contact Information For Health Care Proxy: Chaim Hagen (): [phone]

## 2024-03-20 NOTE — PROCEDURE: DIAGNOSIS COMMENT
Comment: No definitive evidence of body lice on patient or her clothing today, just nonspecific erythematous papules. I do not favor active infestation today; however, given a history of having found a body louse (confirmed on microscopy by an entomologist or insect specialist per patient) and patient's concern, will prescribe a course of ivermectin for presumptive treatment).
Render Risk Assessment In Note?: no
Detail Level: Simple
Comment: Favor acquired as no history of atopy/ichthyosis as a child. Improved today with application of EpiCeram but with persistence of skin changes on abdomen in particular. Several potential causes but thought to be most likely secondary to hyperparathyroidism. Patient reports that serum calcium remains normal and that a repeat test of PTH may have been normal--managed by endocrinology.
Comment: Improved with management of eczema but persistent despite topicals. Unable to tolerate class B and D topical steroids. Tacrolimus ointment helps but is costly. Prior lab evaluations have returned normal. Patient would prefer to avoid systemic therapy like Dupixent. Could consider NBUVB in the future. \\n\\nPreviously on hydroxyzine (did not help) and doxepin (discontinued as would prefer not to risk long-term side effects). I discussed that patient could consider low-dose gabapentin (100 mg qhs), which may help and is highly unlikely to cause dependence, euphoria, dysequilibrium, dizziness, confusion, or sedation at such a low dose. I suggested that patient ask if PCP is okay with prescribing and managing.

## 2024-04-09 ENCOUNTER — APPOINTMENT (OUTPATIENT)
Dept: URBAN - METROPOLITAN AREA CLINIC 295 | Age: 69
Setting detail: DERMATOLOGY
End: 2024-04-09

## 2024-04-09 PROCEDURE — OTHER MIPS QUALITY: OTHER

## 2024-04-09 NOTE — PROCEDURE: MIPS QUALITY
Quality 397: Melanoma: Reporting: Pathology report includes the pT Category, thickness, ulceration and mitotic rate, peripheral and deep margin status and presence or absence of microsatellitosis for invasive tumors.
Quality 47: Advance Care Plan: Advance Care Planning discussed and documented; advance care plan or surrogate decision maker documented in the medical record.
Quality 431: Preventive Care And Screening: Unhealthy Alcohol Use - Screening: Patient not identified as an unhealthy alcohol user when screened for unhealthy alcohol use using a systematic screening method
Quality 137: Melanoma: Continuity Of Care - Recall System: Patient information entered into a recall system that includes: target date for the next exam specified AND a process to follow up with patients regarding missed or unscheduled appointments
Quality 226: Preventive Care And Screening: Tobacco Use: Screening And Cessation Intervention: Patient screened for tobacco use and is an ex/non-smoker
Detail Level: Detailed
Quality 130: Documentation Of Current Medications In The Medical Record: Current Medications Documented
Name And Contact Information For Health Care Proxy: Chaim Hagen (): [phone]

## 2024-04-10 ENCOUNTER — RX ONLY (RX ONLY)
Age: 69
End: 2024-04-10

## 2024-04-10 RX ORDER — IVERMECTIN 5 MG/G
LOTION TOPICAL
Qty: 117 | Refills: 2 | Status: ERX | COMMUNITY
Start: 2024-04-10

## 2024-04-10 RX ORDER — TACROLIMUS 1 MG/G
OINTMENT TOPICAL
Qty: 100 | Refills: 3 | Status: ERX

## 2024-04-10 RX ORDER — PERMETHRIN 50 MG/G
CREAM TOPICAL
Qty: 60 | Refills: 1 | Status: ERX | COMMUNITY
Start: 2024-04-10

## 2024-04-22 NOTE — HPI: SKIN LESION
What Type Of Note Output Would You Prefer (Optional)?: Bullet Format
How Severe Is Your Skin Lesion?: mild
Has Your Skin Lesion Been Treated?: not been treated
Is This A New Presentation, Or A Follow-Up?: Skin Lesion
Additional History: Declined full body exam
none

## 2024-04-23 ENCOUNTER — APPOINTMENT (OUTPATIENT)
Dept: URBAN - METROPOLITAN AREA CLINIC 295 | Age: 69
Setting detail: DERMATOLOGY
End: 2024-04-25

## 2024-04-23 DIAGNOSIS — R20.2 PARESTHESIA OF SKIN: ICD-10-CM

## 2024-04-23 DIAGNOSIS — B07.8 OTHER VIRAL WARTS: ICD-10-CM

## 2024-04-23 DIAGNOSIS — L20.89 OTHER ATOPIC DERMATITIS: ICD-10-CM

## 2024-04-23 PROCEDURE — OTHER DIAGNOSIS COMMENT: OTHER

## 2024-04-23 PROCEDURE — 99214 OFFICE O/P EST MOD 30 MIN: CPT | Mod: 25

## 2024-04-23 PROCEDURE — OTHER TREATMENT REGIMEN: OTHER

## 2024-04-23 PROCEDURE — OTHER COUNSELING: OTHER

## 2024-04-23 PROCEDURE — OTHER MIPS QUALITY: OTHER

## 2024-04-23 PROCEDURE — 17110 DESTRUCT B9 LESION 1-14: CPT

## 2024-04-23 PROCEDURE — OTHER ADDITIONAL NOTES: OTHER

## 2024-04-23 PROCEDURE — OTHER LIQUID NITROGEN: OTHER

## 2024-04-23 ASSESSMENT — BSA RASH: BSA RASH: 1

## 2024-04-23 ASSESSMENT — LOCATION ZONE DERM
LOCATION ZONE: FEET
LOCATION ZONE: LEG
LOCATION ZONE: FINGER

## 2024-04-23 ASSESSMENT — LOCATION DETAILED DESCRIPTION DERM
LOCATION DETAILED: RIGHT MEDIAL PLANTAR HEEL
LOCATION DETAILED: RIGHT DISTAL DORSAL THUMB
LOCATION DETAILED: RIGHT PLANTAR FOREFOOT OVERLYING 2ND METATARSAL
LOCATION DETAILED: LEFT ANTERIOR DISTAL THIGH

## 2024-04-23 ASSESSMENT — LOCATION SIMPLE DESCRIPTION DERM
LOCATION SIMPLE: LEFT THIGH
LOCATION SIMPLE: RIGHT PLANTAR SURFACE
LOCATION SIMPLE: RIGHT THUMB

## 2024-04-23 ASSESSMENT — ITCH NUMERIC RATING SCALE: HOW SEVERE IS YOUR ITCHING?: 5

## 2024-04-23 NOTE — PROCEDURE: ADDITIONAL NOTES
Render Risk Assessment In Note?: no
Detail Level: Simple
Additional Notes: Consider discussing NBUVB phototherapy at next visit

## 2024-04-23 NOTE — PROCEDURE: MIPS QUALITY
Quality 397: Melanoma: Reporting: Pathology report includes the pT Category, thickness, ulceration and mitotic rate, peripheral and deep margin status and presence or absence of microsatellitosis for invasive tumors.
Quality 47: Advance Care Plan: Advance Care Planning discussed and documented; advance care plan or surrogate decision maker documented in the medical record.
Quality 431: Preventive Care And Screening: Unhealthy Alcohol Use - Screening: Patient not identified as an unhealthy alcohol user when screened for unhealthy alcohol use using a systematic screening method
Quality 137: Melanoma: Continuity Of Care - Recall System: Patient information entered into a recall system that includes: target date for the next exam specified AND a process to follow up with patients regarding missed or unscheduled appointments
Quality 226: Preventive Care And Screening: Tobacco Use: Screening And Cessation Intervention: Patient screened for tobacco use and is an ex/non-smoker
Detail Level: Detailed
Quality 130: Documentation Of Current Medications In The Medical Record: Current Medications Documented
Name And Contact Information For Health Care Proxy: Chaim Hagen (): [phone]

## 2024-04-23 NOTE — PROCEDURE: DIAGNOSIS COMMENT
Comment: Declined Dupixent due to concern for side effects. Stable with emollients and avoidance of contact allergens as confirmed by patch testing.
Render Risk Assessment In Note?: no
Detail Level: Simple

## 2024-04-23 NOTE — PROCEDURE: LIQUID NITROGEN
Add 52 Modifier (Optional): no
Show Aperture Variable?: Yes
Medical Necessity Information: It is in your best interest to select a reason for this procedure from the list below. All of these items fulfill various CMS LCD requirements except the new and changing color options.
Spray Paint Text: The liquid nitrogen was applied to the skin utilizing a spray paint frosting technique.
Application Tool (Optional): Liquid Nitrogen Sprayer
Medical Necessity Clause: This procedure was medically necessary because the lesions that were treated were:
Number Of Freeze-Thaw Cycles: 1 freeze-thaw cycle
Post-Care Instructions: I reviewed with the patient in detail post-care instructions. Patient is to wear sunprotection, and avoid picking at any of the treated lesions. Pt may apply Vaseline to crusted or scabbing areas.
Consent: The patient's consent was obtained including but not limited to risks of crusting, scabbing, blistering, scarring, darker or lighter pigmentary change, recurrence, incomplete removal and infection.
Detail Level: Detailed

## 2024-04-23 NOTE — PROCEDURE: COUNSELING
Patient Specific Counseling (Will Not Stick From Patient To Patient): Treated for infestation with both oral ivermectin and topical permethrin. House has been treated for pests. Patient reports uncomfortable sensations on occasion still. Ivermectin lotion/shampoo helped significantly for the scalp, where it was noted she did have pediculosis at one time (no further evidence of persistence of lice or mite infestation except for rare excoriations)
Detail Level: Detailed
Detail Level: Generalized

## 2024-05-21 ENCOUNTER — APPOINTMENT (OUTPATIENT)
Dept: URBAN - METROPOLITAN AREA CLINIC 295 | Age: 69
Setting detail: DERMATOLOGY
End: 2024-05-25

## 2024-05-21 DIAGNOSIS — L20.9 ATOPIC DERMATITIS, UNSPECIFIED: ICD-10-CM

## 2024-05-21 DIAGNOSIS — L82.1 OTHER SEBORRHEIC KERATOSIS: ICD-10-CM

## 2024-05-21 DIAGNOSIS — B07.0 PLANTAR WART: ICD-10-CM

## 2024-05-21 DIAGNOSIS — D18.0 HEMANGIOMA: ICD-10-CM

## 2024-05-21 DIAGNOSIS — Z85.820 PERSONAL HISTORY OF MALIGNANT MELANOMA OF SKIN: ICD-10-CM

## 2024-05-21 DIAGNOSIS — Z71.89 OTHER SPECIFIED COUNSELING: ICD-10-CM

## 2024-05-21 DIAGNOSIS — L81.4 OTHER MELANIN HYPERPIGMENTATION: ICD-10-CM

## 2024-05-21 DIAGNOSIS — D22 MELANOCYTIC NEVI: ICD-10-CM

## 2024-05-21 DIAGNOSIS — L259 CONTACT DERMATITIS AND OTHER ECZEMA, UNSPECIFIED CAUSE: ICD-10-CM

## 2024-05-21 PROBLEM — D22.5 MELANOCYTIC NEVI OF TRUNK: Status: ACTIVE | Noted: 2024-05-21

## 2024-05-21 PROBLEM — L23.89 ALLERGIC CONTACT DERMATITIS DUE TO OTHER AGENTS: Status: ACTIVE | Noted: 2024-05-21

## 2024-05-21 PROBLEM — D18.01 HEMANGIOMA OF SKIN AND SUBCUTANEOUS TISSUE: Status: ACTIVE | Noted: 2024-05-21

## 2024-05-21 PROCEDURE — OTHER BENIGN DESTRUCTION: OTHER

## 2024-05-21 PROCEDURE — OTHER DIAGNOSIS COMMENT: OTHER

## 2024-05-21 PROCEDURE — OTHER ADDITIONAL NOTES: OTHER

## 2024-05-21 PROCEDURE — OTHER COUNSELING: OTHER

## 2024-05-21 PROCEDURE — 17110 DESTRUCT B9 LESION 1-14: CPT

## 2024-05-21 PROCEDURE — OTHER REASSURANCE: OTHER

## 2024-05-21 PROCEDURE — OTHER MIPS QUALITY: OTHER

## 2024-05-21 PROCEDURE — 99214 OFFICE O/P EST MOD 30 MIN: CPT | Mod: 25

## 2024-05-21 PROCEDURE — OTHER SUNSCREEN RECOMMENDATIONS: OTHER

## 2024-05-21 ASSESSMENT — LOCATION DETAILED DESCRIPTION DERM
LOCATION DETAILED: RIGHT DISTAL DORSAL FOREARM
LOCATION DETAILED: RIGHT PLANTAR FOREFOOT OVERLYING 3RD METATARSAL
LOCATION DETAILED: INFERIOR MID FOREHEAD
LOCATION DETAILED: LEFT DISTAL DORSAL FOREARM
LOCATION DETAILED: RIGHT MEDIAL PLANTAR MIDFOOT
LOCATION DETAILED: EPIGASTRIC SKIN
LOCATION DETAILED: RIGHT PLANTAR FOREFOOT OVERLYING 4TH METATARSAL
LOCATION DETAILED: RIGHT PLANTAR FOREFOOT OVERLYING 2ND METATARSAL
LOCATION DETAILED: LEFT MID-UPPER BACK
LOCATION DETAILED: SUPERIOR THORACIC SPINE

## 2024-05-21 ASSESSMENT — LOCATION SIMPLE DESCRIPTION DERM
LOCATION SIMPLE: RIGHT PLANTAR SURFACE
LOCATION SIMPLE: LEFT UPPER BACK
LOCATION SIMPLE: INFERIOR FOREHEAD
LOCATION SIMPLE: ABDOMEN
LOCATION SIMPLE: RIGHT FOREARM
LOCATION SIMPLE: LEFT FOREARM
LOCATION SIMPLE: UPPER BACK

## 2024-05-21 ASSESSMENT — SEVERITY ASSESSMENT 2020: SEVERITY 2020: SEVERE

## 2024-05-21 ASSESSMENT — LOCATION ZONE DERM
LOCATION ZONE: TRUNK
LOCATION ZONE: FACE
LOCATION ZONE: FEET
LOCATION ZONE: ARM

## 2024-05-21 ASSESSMENT — BSA ECZEMA: % BODY COVERED IN ECZEMA: 25

## 2024-05-21 ASSESSMENT — BSA RASH: BSA RASH: 3

## 2024-05-21 ASSESSMENT — ITCH NUMERIC RATING SCALE: HOW SEVERE IS YOUR ITCHING?: 7

## 2024-05-21 NOTE — PROCEDURE: COUNSELING
Detail Level: Generalized
Detail Level: Detailed
Sunscreen Recommendations: I recommend a broad-spectrum sunscreen with an SPF of 30 or higher. SPF 30 sunscreens block approximately 97 percent of the sun's harmful rays. Sunscreens should be applied at least 15 minutes prior to expected sun exposure and then every two hours after that at a minimum (or more frequently if it states in the packaging) as long as sun exposure continues. If swimming or exercising sunscreen should be reapplied every 45 minutes to an hour after getting wet or sweating. One ounce, or the equivalent of a shot glass full of sunscreen, is adequate to protect the skin of the entire body not covered by a bathing suit. I also recommend a lip balm with SPF 30 as well. Mineral-based sunscreens may be tolerated better in patients with sensitive skin and are recommended for anyone with a history of rosacea and/or melasma. Sun-protective clothing (rash guards, sun sleeves, gloves, sunglasses) can be used in lieu of sunscreen but must be worn the entire time you are exposed to the sun's rays.\\n\\nBrands of sunscreen that I like include CeraVe, La Roche-Posay Anthelios, Cetaphil, Vanicream, Neutrogena, Eucerin, Pipette, Blue Lizard, Elta MD, TIZO, Charan, Violet's Choice, Drunk Elephant, Supergoop!, Coola, ALIREZA, Cotz Plus, Avene, Adelina, ZO Skin Health, Obagi MD, FARRUKH, Skinceuticals
Detail Level: Zone

## 2024-05-21 NOTE — HPI: FULL BODY SKIN EXAMINATION
Mario Tomlin called requesting a refill on the following medications:  Requested Prescriptions     Pending Prescriptions Disp Refills    fluticasone-salmeterol (ADVAIR HFA) 230-21 MCG/ACT inhaler 1 Inhaler 11     Sig: Inhale 2 puffs into the lungs 2 times daily     Pharmacy verified: [address]   . pv      Date of last visit: 10/23/2020  Date of next visit (if applicable): Visit date not found
Patient Rx is good for 1 yr place Oct 2020 canceled follow-up in February 2021, needs rescheduled if no longer following with us needs PCP to order
Pt called back about this, she said that she hasn't been going anywhere because of covid and she is planning to schedule an appt but wants to wait until after she gets her covid vaccine. I let her know that she should still have refills on this medication from October. Upon further information, she apparently has been taking 2 prescriptions of fluticasone-slameterol, one is the one that was prescribed in October, the other one is a different strength that she said was prescribed by Silvia Bates when she was in the hospital in July 2020:      Summary: 1 puff, Inhalation, 2 TIMES DAILY, First dose on Sun 7/12/20 at 0945  Rinse mouth out with water (without swallowing) after every dose. Dose, Frequency: 1 puff, 2 TIMES DAILY  Start: 7/12/2020  End: 7/12/2020  Ord/Sold: 7/12/2020 (O)Ordered on: 7/12/2020         Authorized by: Anders Cadena       Discontinued On: 7/12/2020       DC Reason: Formulary change       Admin Instructions: Rinse mouth out with water (without swallowing) after every dose. This is the medication that she says she is out of and needing refills. Please advise the patient regarding this medication.   [phone]
Spoke to Lauro Schulte. Pt has refills on inhalers. Left message for patient to call and speak to pharmacy.
What Type Of Note Output Would You Prefer (Optional)?: Bullet Format
What Is The Reason For Today's Visit?: Annual Full Body Skin Examination with No Concerns
What Is The Reason For Today's Visit? (Being Monitored For X): concerning skin lesions on a periodic basis

## 2024-05-21 NOTE — PROCEDURE: DIAGNOSIS COMMENT
Detail Level: Simple
Render Risk Assessment In Note?: no
Comment: Contact allergy to class B and D topical steroids. Only tolerates tacrolimus ointment, which is expensive for her. Using sparingly to avoid applying this over large body surface area. She has been treated for infestations multiple times and reports persistent itch and has rashes that is interfering with sleep and daily functioning. Does not want Dupixent or another biologic therapy due to concerns regarding potential side effects.
Comment: Status post extended panel patch testing (which was strong positive to nickel and weak positive to iodopropynyl butylcarbamate; irritant to polyester, neomycin, formaldehyde, quaternium-15) and prick testing by allergy/immunology but rashes have not improved significantly following avoidance of contact allergens. Adverse reactions to class B and D topical steroids also.

## 2024-05-21 NOTE — PROCEDURE: ADDITIONAL NOTES
Render Risk Assessment In Note?: no
Additional Notes: Will refer to Dr. Avina in [place] for consultation regarding NBUVB. She is interested in home unit if this treatment helps.
Detail Level: Simple

## 2024-05-21 NOTE — PROCEDURE: SUNSCREEN RECOMMENDATIONS
General Sunscreen Counseling: I recommend a broad-spectrum sunscreen with a SPF of 30 or higher.  SPF 30 sunscreens block approximately 97 percent of the sun's harmful rays.  Sunscreens should be applied at least 15 minutes prior to expected sun exposure and then every two hours after that as long as sun exposure continues. If swimming or exercising sunscreen should be reapplied every 45 minutes to an hour after getting wet or sweating.  One ounce, or the equivalent of a shot glass full of sunscreen, is adequate to protect the skin not covered by a bathing suit. I also recommend a lip balm with at least SPF 30 as well. Sun protective clothing can be used in lieu of sunscreen but must be worn the entire time you are exposed to the sun's rays.\\n\\nCeraVe, La Roche-Posay Anthelios, Cetaphil, Vanicream, Pipette, Neutrogena, Blue Lizard, Elta MD, TIJORGE L, Charan, Violet's Choice, Drunk Elephant, Supergoop!, Coola, ALIREZA, Cotz Plus, ISDIN, Skinceuticals, Sente, ZO Skin Health, Obagi MD, Avene
Detail Level: Detailed

## 2024-05-21 NOTE — PROCEDURE: BENIGN DESTRUCTION
Include Z78.9 (Other Specified Conditions Influencing Health Status) As An Associated Diagnosis?: No
Treatment Number (Will Not Render If 0): 0
Consent: The patient's consent was obtained including but not limited to risks of crusting, scabbing, blistering, scarring, darker or lighter pigmentary change, recurrence, incomplete removal and infection.
Medical Necessity Information: It is in your best interest to select a reason for this procedure from the list below. All of these items fulfill various CMS LCD requirements except the new and changing color options.
Detail Level: Detailed
Anesthesia Volume In Cc: 0.5
Post-Care Instructions: I reviewed with the patient in detail post-care instructions. Patient is to wear sunprotection, and avoid picking at any of the treated lesions. Pt may apply Vaseline to crusted or scabbing areas.
Medical Necessity Clause: This procedure was medically necessary because the lesions that were treated were:

## 2024-06-27 ENCOUNTER — APPOINTMENT (OUTPATIENT)
Dept: URBAN - METROPOLITAN AREA CLINIC 295 | Age: 69
Setting detail: DERMATOLOGY
End: 2024-07-04

## 2024-06-27 DIAGNOSIS — L20.89 OTHER ATOPIC DERMATITIS: ICD-10-CM

## 2024-06-27 DIAGNOSIS — Z85.820 PERSONAL HISTORY OF MALIGNANT MELANOMA OF SKIN: ICD-10-CM

## 2024-06-27 DIAGNOSIS — B36.0 PITYRIASIS VERSICOLOR: ICD-10-CM

## 2024-06-27 DIAGNOSIS — B07.8 OTHER VIRAL WARTS: ICD-10-CM

## 2024-06-27 PROCEDURE — OTHER ADDITIONAL NOTES: OTHER

## 2024-06-27 PROCEDURE — OTHER LIQUID NITROGEN: OTHER

## 2024-06-27 PROCEDURE — 99214 OFFICE O/P EST MOD 30 MIN: CPT | Mod: 25

## 2024-06-27 PROCEDURE — OTHER PRESCRIPTION: OTHER

## 2024-06-27 PROCEDURE — 17110 DESTRUCT B9 LESION 1-14: CPT

## 2024-06-27 PROCEDURE — OTHER MIPS QUALITY: OTHER

## 2024-06-27 PROCEDURE — OTHER PRESCRIPTION MEDICATION MANAGEMENT: OTHER

## 2024-06-27 PROCEDURE — OTHER KOH PREP: OTHER

## 2024-06-27 PROCEDURE — OTHER REASSURANCE: OTHER

## 2024-06-27 PROCEDURE — OTHER COUNSELING: OTHER

## 2024-06-27 RX ORDER — KETOCONAZOLE 20 MG/G
CREAM TOPICAL BID
Qty: 60 | Refills: 3 | Status: ERX | COMMUNITY
Start: 2024-06-27

## 2024-06-27 RX ORDER — TACROLIMUS 1 MG/G
OINTMENT TOPICAL
Qty: 60 | Refills: 3 | Status: ERX

## 2024-06-27 ASSESSMENT — LOCATION DETAILED DESCRIPTION DERM
LOCATION DETAILED: SUPERIOR THORACIC SPINE
LOCATION DETAILED: LEFT LATERAL PLANTAR HEEL
LOCATION DETAILED: EPIGASTRIC SKIN
LOCATION DETAILED: LEFT MEDIAL PLANTAR HEEL

## 2024-06-27 ASSESSMENT — LOCATION SIMPLE DESCRIPTION DERM
LOCATION SIMPLE: LEFT PLANTAR SURFACE
LOCATION SIMPLE: ABDOMEN
LOCATION SIMPLE: UPPER BACK

## 2024-06-27 ASSESSMENT — ITCH NUMERIC RATING SCALE: HOW SEVERE IS YOUR ITCHING?: 8

## 2024-06-27 ASSESSMENT — LOCATION ZONE DERM
LOCATION ZONE: FEET
LOCATION ZONE: TRUNK

## 2024-06-27 ASSESSMENT — BSA ECZEMA: % BODY COVERED IN ECZEMA: 40

## 2024-06-27 NOTE — PROCEDURE: ADDITIONAL NOTES
Detail Level: Simple
Additional Notes: Patient discussed option of \"skin scraping\" on abdomen that was recommended by another specialist whom she saw recently. She reports that they will be sending records. \\n\\nI explained that there are nonspecific tests like scratching to assess for dermatographism or testing for pathergy on the abdomen using a syringe, but I would need to know the specific test and what is being tested for to understand what is being requested and to know what would need to be ordered.
Render Risk Assessment In Note?: no

## 2024-06-27 NOTE — PROCEDURE: PRESCRIPTION MEDICATION MANAGEMENT
Continue Regimen: Tacrolimus ointment
Detail Level: Zone
Render In Strict Bullet Format?: No
Plan: Failed topical steroids. Declines Dupixent
Initiate Treatment: NBUVB Phototherapy

## 2024-06-27 NOTE — PROCEDURE: MIPS QUALITY
Quality 397: Melanoma: Reporting: Pathology report includes the pT Category, thickness, ulceration and mitotic rate, peripheral and deep margin status and presence or absence of microsatellitosis for invasive tumors.
Quality 47: Advance Care Plan: Advance Care Planning discussed and documented; advance care plan or surrogate decision maker documented in the medical record.
Quality 226: Preventive Care And Screening: Tobacco Use: Screening And Cessation Intervention: Patient screened for tobacco use and is an ex/non-smoker
Detail Level: Detailed
Quality 137: Melanoma: Continuity Of Care - Recall System: Patient information entered into a recall system that includes: target date for the next exam specified AND a process to follow up with patients regarding missed or unscheduled appointments
Quality 130: Documentation Of Current Medications In The Medical Record: Current Medications Documented
Quality 431: Preventive Care And Screening: Unhealthy Alcohol Use - Screening: Patient not identified as an unhealthy alcohol user when screened for unhealthy alcohol use using a systematic screening method

## 2024-06-27 NOTE — HPI: ITCHING
What Type Of Note Output Would You Prefer (Optional)?: Bullet Format
On A Scale Of 0 To 10 How Would You Rate Your Itching?: 10
How Did Your Itching Occur?: gradual in onset  (over a period of years)
How Severe Is Your Itching?: severe
Additional History: Patient wanted to request that the doctor scrape her stomach for samples, as well as talking about a yeast infection and of diet can aid with the itch.

## 2024-06-27 NOTE — PROCEDURE: LIQUID NITROGEN
Detail Level: Detailed
Post-Care Instructions: I reviewed with the patient in detail post-care instructions. Patient is to wear sunprotection, and avoid picking at any of the treated lesions. Pt may apply Vaseline to crusted or scabbing areas.
Medical Necessity Information: It is in your best interest to select a reason for this procedure from the list below. All of these items fulfill various CMS LCD requirements except the new and changing color options.
Render Post-Care Instructions In Note?: no
Show Aperture Variable?: Yes
Application Tool (Optional): Liquid Nitrogen Sprayer
Consent: The patient's consent was obtained including but not limited to risks of crusting, scabbing, blistering, scarring, darker or lighter pigmentary change, recurrence, incomplete removal and infection.
Medical Necessity Clause: This procedure was medically necessary because the lesions that were treated were:
Spray Paint Text: The liquid nitrogen was applied to the skin utilizing a spray paint frosting technique.
Number Of Freeze-Thaw Cycles: 1 freeze-thaw cycle

## 2024-07-15 ENCOUNTER — APPOINTMENT (OUTPATIENT)
Dept: URBAN - METROPOLITAN AREA CLINIC 294 | Age: 69
Setting detail: DERMATOLOGY
End: 2024-07-16

## 2024-07-15 DIAGNOSIS — L20.89 OTHER ATOPIC DERMATITIS: ICD-10-CM

## 2024-07-15 DIAGNOSIS — B36.0 PITYRIASIS VERSICOLOR: ICD-10-CM

## 2024-07-15 PROCEDURE — OTHER PHOTOTHERAPY TREATMENT: OTHER

## 2024-07-15 PROCEDURE — 96900 ACTINOTHERAPY UV LIGHT: CPT

## 2024-07-15 PROCEDURE — OTHER COUNSELING: OTHER

## 2024-07-15 PROCEDURE — 99214 OFFICE O/P EST MOD 30 MIN: CPT | Mod: 25

## 2024-07-15 PROCEDURE — OTHER ADDITIONAL NOTES: OTHER

## 2024-07-15 PROCEDURE — OTHER PRESCRIPTION MEDICATION MANAGEMENT: OTHER

## 2024-07-15 PROCEDURE — OTHER MIPS QUALITY: OTHER

## 2024-07-15 PROCEDURE — OTHER SEPARATE AND IDENTIFIABLE DOCUMENTATION: OTHER

## 2024-07-15 ASSESSMENT — LOCATION DETAILED DESCRIPTION DERM
LOCATION DETAILED: LEFT LATERAL ABDOMEN
LOCATION DETAILED: LEFT MEDIAL SUPERIOR CHEST

## 2024-07-15 ASSESSMENT — LOCATION SIMPLE DESCRIPTION DERM
LOCATION SIMPLE: CHEST
LOCATION SIMPLE: ABDOMEN

## 2024-07-15 ASSESSMENT — LOCATION ZONE DERM: LOCATION ZONE: TRUNK

## 2024-07-15 NOTE — PROCEDURE: PHOTOTHERAPY TREATMENT
Protocol For Photochemotherapy For Severe Photoresponsive Dermatoses: Petrolatum And Nbuvb: The patient received Photochemotherapy for severe photoresponsive dermatoses: Petrolatum and NBUVB requiring at least 4 to 8 hours of care under direct physician supervision.
Protocol For Photochemotherapy: Petrolatum And Nbuvb: The patient received Photochemotherapy: Petrolatum and NBUVB (petrolatum applied to all lesions prior to phototherapy).
Protocol For Photochemotherapy For Severe Photoresponsive Dermatoses: Petrolatum And Broad Band Uvb: The patient received Photochemotherapyfor severe photoresponsive dermatoses: Petrolatum and Broad Band UVB requiring at least 4 to 8 hours of care under direct physician supervision.
Detail Level: Zone
Protocol For Photochemotherapy: Baby Oil And Nbuvb: The patient received Photochemotherapy: Baby Oil and NBUVB (baby oil applied to all lesions prior to phototherapy).
Protocol For Photochemotherapy: Tar And Broad Band Uvb (Goeckerman Treatment): The patient received Photochemotherapy: Tar and Broad Band UVB (Goeckerman treatment).
Protocol For Puva: The patient received PUVA.
Consent: Written consent obtained.  The risks were reviewed with the patient including but not limited to: burn, pigmentary changes, pain, blistering, scabbing, redness, increased risk of skin cancers, and the remote possibility of scarring.
Treatment Number: 1
Protocol For Nbuvb: The patient received NBUVB.
Render Post-Care In The Note: no
Protocol For Nb Uva: The patient received NB UVA.
Protocol For Photochemotherapy For Severe Photoresponsive Dermatoses: Puva: The patient received Photochemotherapy for severe photoresponsive dermatoses: PUVA requiring at least 4 to 8 hours of care under direct physician supervision.
Protocol For Uva: The patient received UVA.
Protocol For Photochemotherapy: Mineral Oil And Nbuvb: The patient received Photochemotherapy: Mineral Oil and NBUVB (mineral oil applied to all lesions prior to phototherapy).
Protocol For Photochemotherapy: Petrolatum And Broad Band Uvb: The patient received Photochemotherapy: Petrolatum and Broad Band UVB.
Protocol For Photochemotherapy: Mineral Oil And Broad Band Uvb: The patient received Photochemotherapy: Mineral Oil and Broad Band UVB.
Skin Type: II
Protocol For Photochemotherapy: Triamcinolone Ointment And Nbuvb: The patient received Photochemotherapy: Triamcinolone and NBUVB (triamcinolone ointment applied to all lesions prior to phototherapy).
Protocol For Photochemotherapy For Severe Photoresponsive Dermatoses: Tar And Nbuvb (Goeckerman Treatment): The patient received Photochemotherapy for severe photoresponsive dermatoses: Tar and NBUVB (Goeckerman treatment) requiring at least 4 to 8 hours of care under direct physician supervision.
Protocol For Photochemotherapy For Severe Photoresponsive Dermatoses: Tar And Broad Band Uvb (Goeckerman Treatment): The patient received Photochemotherapy for severe photoresponsive dermatoses: Tar and Broad Band UVB (Goeckerman treatment) requiring at least 4 to 8 hours of care under direct physician supervision.
Protocol For Photochemotherapy: Tar And Nbuvb (Goeckerman Treatment): The patient received Photochemotherapy: Tar and NBUVB (Goeckerman treatment).
Protocol For Bath Puva: The patient received Bath PUVA.
Protocol For Uva1: The patient received UVA1.
Post-Care Instructions: I reviewed with the patient in detail post-care instructions. Patient is to wear sun protection. Patients may expect sunburn like redness, discomfort and scabbing.
Protocol For Protocol For Photochemotherapy For Severe Photoresponsive Dermatoses: Bath Puva: The patient received Photochemotherapy for severe photoresponsive dermatoses: Bath PUVA requiring at least 4 to 8 hours of care under direct physician supervision.
Protocol: NBUVB
Protocol For Broad Band Uvb: The patient received Broad Band UVB.

## 2024-07-15 NOTE — PROCEDURE: PRESCRIPTION MEDICATION MANAGEMENT
Continue Regimen: Tacrolimus ointment
Detail Level: Zone
Render In Strict Bullet Format?: No
Plan: Failed topical steroids. Declines Dupixent
Initiate Treatment: NBUVB Phototherapy
Continue Regimen: ketoconazole 2 % topical cream BID\\nSig: Apply twice daily for flakey and discolored spots as needed for at least 2 weeks at a time. PRN flares.

## 2024-07-15 NOTE — PROCEDURE: ADDITIONAL NOTES
Render Risk Assessment In Note?: no
Detail Level: Simple
Additional Notes: Pt recommended to return to Dr. Rojo for standard shave biopsy from abdomen. Rule out psoriasis, ctcl, parapsoriasis, atopic derm

## 2024-07-16 ENCOUNTER — APPOINTMENT (OUTPATIENT)
Dept: URBAN - METROPOLITAN AREA CLINIC 295 | Age: 69
Setting detail: DERMATOLOGY
End: 2024-07-18

## 2024-07-16 DIAGNOSIS — R21 RASH AND OTHER NONSPECIFIC SKIN ERUPTION: ICD-10-CM

## 2024-07-16 PROCEDURE — OTHER BIOPSY BY SHAVE METHOD: OTHER

## 2024-07-16 PROCEDURE — 11103 TANGNTL BX SKIN EA SEP/ADDL: CPT

## 2024-07-16 PROCEDURE — 11102 TANGNTL BX SKIN SINGLE LES: CPT

## 2024-07-16 PROCEDURE — OTHER MIPS QUALITY: OTHER

## 2024-07-16 RX ORDER — KETOCONAZOLE 20 MG/G
CREAM TOPICAL BID
Qty: 120 | Refills: 3 | Status: ERX

## 2024-07-16 ASSESSMENT — LOCATION SIMPLE DESCRIPTION DERM: LOCATION SIMPLE: ABDOMEN

## 2024-07-16 ASSESSMENT — LOCATION ZONE DERM: LOCATION ZONE: TRUNK

## 2024-07-16 ASSESSMENT — LOCATION DETAILED DESCRIPTION DERM
LOCATION DETAILED: LEFT RIB CAGE
LOCATION DETAILED: RIGHT RIB CAGE

## 2024-07-16 NOTE — PROCEDURE: BIOPSY BY SHAVE METHOD
Detail Level: Detailed
Depth Of Biopsy: dermis
Was A Bandage Applied: Yes
Size Of Lesion In Cm: 0
Biopsy Type: H and E
Biopsy Method: Dermablade
Anesthesia Type: 2% lidocaine without epinephrine and a 1:10 solution of 8.4% sodium bicarbonate
Anesthesia Volume In Cc: 0.5
Hemostasis: Drysol
Wound Care: Petrolatum
Dressing: bandage
Destruction After The Procedure: No
Type Of Destruction Used: Curettage
Curettage Text: The wound bed was treated with curettage after the biopsy was performed.
Cryotherapy Text: The wound bed was treated with cryotherapy after the biopsy was performed.
Electrodesiccation Text: The wound bed was treated with electrodesiccation after the biopsy was performed.
Electrodesiccation And Curettage Text: The wound bed was treated with electrodesiccation and curettage after the biopsy was performed.
Silver Nitrate Text: The wound bed was treated with silver nitrate after the biopsy was performed.
Lab: [id number]
Consent: Written consent was obtained and risks were reviewed including but not limited to scarring, infection, bleeding, scabbing, incomplete removal, nerve damage and allergy to anesthesia.
Post-Care Instructions: I reviewed with the patient in detail post-care instructions. Patient is to keep the biopsy site dry overnight, and then apply bacitracin twice daily until healed. Patient may apply hydrogen peroxide soaks to remove any crusting.
Notification Instructions: Patient will be notified of biopsy results. However, patient instructed to call the office if not contacted within 2 weeks.
Billing Type: Third-Party Bill
Information: Selecting Yes will display possible errors in your note based on the variables you have selected. This validation is only offered as a suggestion for you. PLEASE NOTE THAT THE VALIDATION TEXT WILL BE REMOVED WHEN YOU FINALIZE YOUR NOTE. IF YOU WANT TO FAX A PRELIMINARY NOTE YOU WILL NEED TO TOGGLE THIS TO 'NO' IF YOU DO NOT WANT IT IN YOUR FAXED NOTE.

## 2024-07-18 ENCOUNTER — APPOINTMENT (OUTPATIENT)
Dept: URBAN - METROPOLITAN AREA CLINIC 294 | Age: 69
Setting detail: DERMATOLOGY
End: 2024-07-25

## 2024-07-18 ENCOUNTER — RX ONLY (RX ONLY)
Age: 69
End: 2024-07-18

## 2024-07-18 DIAGNOSIS — L20.89 OTHER ATOPIC DERMATITIS: ICD-10-CM

## 2024-07-18 PROCEDURE — 96900 ACTINOTHERAPY UV LIGHT: CPT

## 2024-07-18 PROCEDURE — OTHER PHOTOTHERAPY TREATMENT: OTHER

## 2024-07-18 RX ORDER — KETOCONAZOLE 20 MG/G
CREAM TOPICAL
Qty: 60 | Refills: 5 | Status: CANCELLED
Stop reason: CLARIF

## 2024-07-18 NOTE — PROCEDURE: PHOTOTHERAPY TREATMENT
Total Treatment Time: 1:21
Protocol For Photochemotherapy For Severe Photoresponsive Dermatoses: Petrolatum And Nbuvb: The patient received Photochemotherapy for severe photoresponsive dermatoses: Petrolatum and NBUVB requiring at least 4 to 8 hours of care under direct physician supervision.
Protocol For Photochemotherapy: Petrolatum And Nbuvb: The patient received Photochemotherapy: Petrolatum and NBUVB (petrolatum applied to all lesions prior to phototherapy).
Protocol For Photochemotherapy For Severe Photoresponsive Dermatoses: Petrolatum And Broad Band Uvb: The patient received Photochemotherapyfor severe photoresponsive dermatoses: Petrolatum and Broad Band UVB requiring at least 4 to 8 hours of care under direct physician supervision.
Detail Level: Zone
Protocol For Photochemotherapy: Baby Oil And Nbuvb: The patient received Photochemotherapy: Baby Oil and NBUVB (baby oil applied to all lesions prior to phototherapy).
Protocol For Photochemotherapy: Tar And Broad Band Uvb (Goeckerman Treatment): The patient received Photochemotherapy: Tar and Broad Band UVB (Goeckerman treatment).
Protocol For Puva: The patient received PUVA.
Consent: Written consent obtained.  The risks were reviewed with the patient including but not limited to: burn, pigmentary changes, pain, blistering, scabbing, redness, increased risk of skin cancers, and the remote possibility of scarring.
Treatment Number: 1
Protocol For Nbuvb: The patient received NBUVB.
Render Post-Care In The Note: no
Name Of Supervising Technician: lindsey
Protocol For Nb Uva: The patient received NB UVA.
Protocol For Photochemotherapy For Severe Photoresponsive Dermatoses: Puva: The patient received Photochemotherapy for severe photoresponsive dermatoses: PUVA requiring at least 4 to 8 hours of care under direct physician supervision.
Protocol For Uva: The patient received UVA.
Protocol For Photochemotherapy: Mineral Oil And Nbuvb: The patient received Photochemotherapy: Mineral Oil and NBUVB (mineral oil applied to all lesions prior to phototherapy).
Protocol For Photochemotherapy: Petrolatum And Broad Band Uvb: The patient received Photochemotherapy: Petrolatum and Broad Band UVB.
Protocol For Photochemotherapy: Mineral Oil And Broad Band Uvb: The patient received Photochemotherapy: Mineral Oil and Broad Band UVB.
Skin Type: II
Comments On Previous Treatment: Last treatment went well
Protocol For Photochemotherapy: Triamcinolone Ointment And Nbuvb: The patient received Photochemotherapy: Triamcinolone and NBUVB (triamcinolone ointment applied to all lesions prior to phototherapy).
Protocol For Photochemotherapy For Severe Photoresponsive Dermatoses: Tar And Nbuvb (Goeckerman Treatment): The patient received Photochemotherapy for severe photoresponsive dermatoses: Tar and NBUVB (Goeckerman treatment) requiring at least 4 to 8 hours of care under direct physician supervision.
Protocol For Photochemotherapy For Severe Photoresponsive Dermatoses: Tar And Broad Band Uvb (Goeckerman Treatment): The patient received Photochemotherapy for severe photoresponsive dermatoses: Tar and Broad Band UVB (Goeckerman treatment) requiring at least 4 to 8 hours of care under direct physician supervision.
Protocol For Photochemotherapy: Tar And Nbuvb (Goeckerman Treatment): The patient received Photochemotherapy: Tar and NBUVB (Goeckerman treatment).
Protocol For Bath Puva: The patient received Bath PUVA.
Protocol For Uva1: The patient received UVA1.
Post-Care Instructions: I reviewed with the patient in detail post-care instructions. Patient is to wear sun protection. Patients may expect sunburn like redness, discomfort and scabbing.
Protocol For Protocol For Photochemotherapy For Severe Photoresponsive Dermatoses: Bath Puva: The patient received Photochemotherapy for severe photoresponsive dermatoses: Bath PUVA requiring at least 4 to 8 hours of care under direct physician supervision.
Changes In Treatment Protocol: Increased 8 sec
Protocol: NBUVB
Protocol For Broad Band Uvb: The patient received Broad Band UVB.

## 2024-07-22 ENCOUNTER — APPOINTMENT (OUTPATIENT)
Dept: URBAN - METROPOLITAN AREA CLINIC 294 | Age: 69
Setting detail: DERMATOLOGY
End: 2024-07-29

## 2024-07-22 DIAGNOSIS — L20.89 OTHER ATOPIC DERMATITIS: ICD-10-CM

## 2024-07-22 PROCEDURE — OTHER ADDITIONAL NOTES: OTHER

## 2024-07-22 PROCEDURE — 99213 OFFICE O/P EST LOW 20 MIN: CPT

## 2024-07-22 PROCEDURE — OTHER MIPS QUALITY: OTHER

## 2024-07-22 PROCEDURE — OTHER COUNSELING: OTHER

## 2024-07-22 ASSESSMENT — LOCATION ZONE DERM: LOCATION ZONE: TRUNK

## 2024-07-22 ASSESSMENT — LOCATION SIMPLE DESCRIPTION DERM: LOCATION SIMPLE: CHEST

## 2024-07-22 ASSESSMENT — LOCATION DETAILED DESCRIPTION DERM: LOCATION DETAILED: LEFT MEDIAL SUPERIOR CHEST

## 2024-07-22 NOTE — PROCEDURE: ADDITIONAL NOTES
Additional Notes: Patient is going to hold off on light box for one week. She will start over if desired at skin type 1. She will treat her skin today at home with aloe, ice, hydrocortisone for mild erythema.
Render Risk Assessment In Note?: no
Detail Level: Simple

## 2024-07-24 ENCOUNTER — RX ONLY (RX ONLY)
Age: 69
End: 2024-07-24

## 2024-07-24 RX ORDER — KETOCONAZOLE 20 MG/ML
SHAMPOO, SUSPENSION TOPICAL
Qty: 240 | Refills: 11 | Status: ERX

## 2024-07-24 RX ORDER — FLUCONAZOLE 150 MG/1
TABLET ORAL
Qty: 7 | Refills: 0 | Status: ERX | COMMUNITY
Start: 2024-07-24

## 2024-09-26 ENCOUNTER — APPOINTMENT (OUTPATIENT)
Dept: URBAN - METROPOLITAN AREA CLINIC 294 | Age: 69
Setting detail: DERMATOLOGY
End: 2024-09-30

## 2024-09-26 DIAGNOSIS — L20.89 OTHER ATOPIC DERMATITIS: ICD-10-CM

## 2024-09-26 PROCEDURE — OTHER PHOTOTHERAPY TREATMENT: OTHER

## 2024-09-26 PROCEDURE — 96900 ACTINOTHERAPY UV LIGHT: CPT

## 2024-09-26 NOTE — PROCEDURE: PHOTOTHERAPY TREATMENT
Protocol For Photochemotherapy For Severe Photoresponsive Dermatoses: Petrolatum And Nbuvb: The patient received Photochemotherapy for severe photoresponsive dermatoses: Petrolatum and NBUVB requiring at least 4 to 8 hours of care under direct physician supervision.
Protocol For Photochemotherapy: Petrolatum And Nbuvb: The patient received Photochemotherapy: Petrolatum and NBUVB (petrolatum applied to all lesions prior to phototherapy).
Protocol For Photochemotherapy For Severe Photoresponsive Dermatoses: Petrolatum And Broad Band Uvb: The patient received Photochemotherapyfor severe photoresponsive dermatoses: Petrolatum and Broad Band UVB requiring at least 4 to 8 hours of care under direct physician supervision.
Detail Level: Zone
Protocol For Photochemotherapy: Baby Oil And Nbuvb: The patient received Photochemotherapy: Baby Oil and NBUVB (baby oil applied to all lesions prior to phototherapy).
Protocol For Photochemotherapy: Tar And Broad Band Uvb (Goeckerman Treatment): The patient received Photochemotherapy: Tar and Broad Band UVB (Goeckerman treatment).
Protocol For Puva: The patient received PUVA.
Consent: Written consent obtained.  The risks were reviewed with the patient including but not limited to: burn, pigmentary changes, pain, blistering, scabbing, redness, increased risk of skin cancers, and the remote possibility of scarring.
Treatment Number: 1
Protocol For Nbuvb: The patient received NBUVB.
Render Post-Care In The Note: no
Name Of Supervising Technician: lindsey
Protocol For Nb Uva: The patient received NB UVA.
Protocol For Photochemotherapy For Severe Photoresponsive Dermatoses: Puva: The patient received Photochemotherapy for severe photoresponsive dermatoses: PUVA requiring at least 4 to 8 hours of care under direct physician supervision.
Protocol For Uva: The patient received UVA.
Protocol For Photochemotherapy: Mineral Oil And Nbuvb: The patient received Photochemotherapy: Mineral Oil and NBUVB (mineral oil applied to all lesions prior to phototherapy).
Protocol For Photochemotherapy: Petrolatum And Broad Band Uvb: The patient received Photochemotherapy: Petrolatum and Broad Band UVB.
Protocol For Photochemotherapy: Mineral Oil And Broad Band Uvb: The patient received Photochemotherapy: Mineral Oil and Broad Band UVB.
Skin Type: I
Protocol For Photochemotherapy: Triamcinolone Ointment And Nbuvb: The patient received Photochemotherapy: Triamcinolone and NBUVB (triamcinolone ointment applied to all lesions prior to phototherapy).
Protocol For Photochemotherapy For Severe Photoresponsive Dermatoses: Tar And Nbuvb (Goeckerman Treatment): The patient received Photochemotherapy for severe photoresponsive dermatoses: Tar and NBUVB (Goeckerman treatment) requiring at least 4 to 8 hours of care under direct physician supervision.
Protocol For Photochemotherapy For Severe Photoresponsive Dermatoses: Tar And Broad Band Uvb (Goeckerman Treatment): The patient received Photochemotherapy for severe photoresponsive dermatoses: Tar and Broad Band UVB (Goeckerman treatment) requiring at least 4 to 8 hours of care under direct physician supervision.
Protocol For Photochemotherapy: Tar And Nbuvb (Goeckerman Treatment): The patient received Photochemotherapy: Tar and NBUVB (Goeckerman treatment).
Protocol For Bath Puva: The patient received Bath PUVA.
Protocol For Uva1: The patient received UVA1.
Total Body Time: 0:43 sec
Post-Care Instructions: I reviewed with the patient in detail post-care instructions. Patient is to wear sun protection. Patients may expect sunburn like redness, discomfort and scabbing.
Protocol For Protocol For Photochemotherapy For Severe Photoresponsive Dermatoses: Bath Puva: The patient received Photochemotherapy for severe photoresponsive dermatoses: Bath PUVA requiring at least 4 to 8 hours of care under direct physician supervision.
Protocol: NBUVB
Protocol For Broad Band Uvb: The patient received Broad Band UVB.

## 2024-10-10 ENCOUNTER — RX ONLY (RX ONLY)
Age: 69
End: 2024-10-10

## 2024-10-10 RX ORDER — KETOCONAZOLE 20 MG/ML
SHAMPOO, SUSPENSION TOPICAL
Qty: 240 | Refills: 11 | Status: ERX

## 2024-10-10 RX ORDER — KETOCONAZOLE 20 MG/G
CREAM TOPICAL BID
Qty: 120 | Refills: 3 | Status: ERX

## 2024-11-11 ENCOUNTER — APPOINTMENT (OUTPATIENT)
Dept: URBAN - METROPOLITAN AREA CLINIC 295 | Age: 69
Setting detail: DERMATOLOGY
End: 2024-11-15

## 2024-11-11 DIAGNOSIS — B36.0 PITYRIASIS VERSICOLOR: ICD-10-CM

## 2024-11-11 DIAGNOSIS — D485 NEOPLASM OF UNCERTAIN BEHAVIOR OF SKIN: ICD-10-CM

## 2024-11-11 DIAGNOSIS — L20.89 OTHER ATOPIC DERMATITIS: ICD-10-CM

## 2024-11-11 PROBLEM — L30.9 DERMATITIS, UNSPECIFIED: Status: ACTIVE | Noted: 2024-11-11

## 2024-11-11 PROBLEM — D48.5 NEOPLASM OF UNCERTAIN BEHAVIOR OF SKIN: Status: ACTIVE | Noted: 2024-11-11

## 2024-11-11 PROCEDURE — OTHER MIPS QUALITY: OTHER

## 2024-11-11 PROCEDURE — 99214 OFFICE O/P EST MOD 30 MIN: CPT | Mod: 25

## 2024-11-11 PROCEDURE — OTHER PRESCRIPTION MEDICATION MANAGEMENT: OTHER

## 2024-11-11 PROCEDURE — OTHER COUNSELING: OTHER

## 2024-11-11 PROCEDURE — 11102 TANGNTL BX SKIN SINGLE LES: CPT

## 2024-11-11 PROCEDURE — OTHER DIAGNOSIS COMMENT: OTHER

## 2024-11-11 PROCEDURE — OTHER PRESCRIPTION: OTHER

## 2024-11-11 PROCEDURE — OTHER BIOPSY BY SHAVE METHOD: OTHER

## 2024-11-11 RX ORDER — RUXOLITINIB 15 MG/G
CREAM TOPICAL
Qty: 60 | Refills: 2 | Status: ERX | COMMUNITY
Start: 2024-11-11

## 2024-11-11 RX ORDER — KETOCONAZOLE 20 MG/ML
SHAMPOO, SUSPENSION TOPICAL
Qty: 240 | Refills: 5 | Status: ERX | COMMUNITY
Start: 2024-11-11

## 2024-11-11 RX ORDER — KETOCONAZOLE 20 MG/G
CREAM TOPICAL BID
Qty: 120 | Refills: 3 | Status: ERX

## 2024-11-11 ASSESSMENT — LOCATION DETAILED DESCRIPTION DERM
LOCATION DETAILED: LEFT INFERIOR MEDIAL UPPER BACK
LOCATION DETAILED: INFERIOR THORACIC SPINE
LOCATION DETAILED: RIGHT PROXIMAL DORSAL FOREARM
LOCATION DETAILED: RIGHT ANTERIOR DISTAL THIGH
LOCATION DETAILED: LEFT PROXIMAL DORSAL FOREARM

## 2024-11-11 ASSESSMENT — LOCATION SIMPLE DESCRIPTION DERM
LOCATION SIMPLE: RIGHT FOREARM
LOCATION SIMPLE: RIGHT THIGH
LOCATION SIMPLE: UPPER BACK
LOCATION SIMPLE: LEFT UPPER BACK
LOCATION SIMPLE: LEFT FOREARM

## 2024-11-11 ASSESSMENT — ITCH NUMERIC RATING SCALE: HOW SEVERE IS YOUR ITCHING?: 7

## 2024-11-11 ASSESSMENT — LOCATION ZONE DERM
LOCATION ZONE: ARM
LOCATION ZONE: LEG
LOCATION ZONE: TRUNK

## 2024-11-11 ASSESSMENT — BSA ECZEMA: % BODY COVERED IN ECZEMA: 30

## 2024-11-11 NOTE — PROCEDURE: DIAGNOSIS COMMENT
Comment: Failed Triamcinolone, desoximetasone, tacrolimus, clobetasol, and phototherapy.
Detail Level: Simple
Render Risk Assessment In Note?: no

## 2024-11-11 NOTE — PROCEDURE: PRESCRIPTION MEDICATION MANAGEMENT
Detail Level: Zone
Initiate Treatment: .\\nOpzelura 1.5 % topical cream : Apply twice daily for itch/rash as needed for eczema.
Samples Given: .\\nOpselura
Render In Strict Bullet Format?: Yes
Continue Regimen: .\\nketoconazole 2 % shampoo : Lather onto the affected areas of the body as well as scalp as a body wash in the shower daily until resolved and at least twice a week after for prevention. Leave on for three to five minutes, and then rinse off.\\nketoconazole 2 % topical cream BID: Apply twice daily for flaky and discolored spots as needed until resolved PRN flares.

## 2024-11-11 NOTE — PROCEDURE: BIOPSY BY SHAVE METHOD
Detail Level: Detailed
Depth Of Biopsy: dermis
Was A Bandage Applied: Yes
Size Of Lesion In Cm: 0.6
X Size Of Lesion In Cm: 0
Biopsy Type: H and E
Biopsy Method: Dermablade
Anesthesia Type: 2% lidocaine without epinephrine and a 1:10 solution of 8.4% sodium bicarbonate
Anesthesia Volume In Cc: 0.5
Hemostasis: Drysol
Wound Care: Petrolatum
Dressing: bandage
Destruction After The Procedure: No
Type Of Destruction Used: Curettage
Curettage Text: The wound bed was treated with curettage after the biopsy was performed.
Cryotherapy Text: The wound bed was treated with cryotherapy after the biopsy was performed.
Electrodesiccation Text: The wound bed was treated with electrodesiccation after the biopsy was performed.
Electrodesiccation And Curettage Text: The wound bed was treated with electrodesiccation and curettage after the biopsy was performed.
Silver Nitrate Text: The wound bed was treated with silver nitrate after the biopsy was performed.
Lab: [id number]
Consent: Verbal or written consent was obtained, and risks were reviewed including but not limited to scarring, infection, bleeding, scabbing, incomplete removal, nerve damage and allergy to anesthesia.
Post-Care Instructions: I reviewed with the patient in detail post-care instructions. Patient is to keep the biopsy site dry overnight, and then apply petrolatum, mupirocin, or bacitracin ointment one to two times daily until healed. Patient may apply hydrogen peroxide soaks to remove any crusting.
Notification Instructions: Patient will be notified of biopsy results. However, patient instructed to call the office if not contacted within 2 weeks.
Billing Type: Third-Party Bill
Information: Selecting Yes will display possible errors in your note based on the variables you have selected. This validation is only offered as a suggestion for you. PLEASE NOTE THAT THE VALIDATION TEXT WILL BE REMOVED WHEN YOU FINALIZE YOUR NOTE. IF YOU WANT TO FAX A PRELIMINARY NOTE YOU WILL NEED TO TOGGLE THIS TO 'NO' IF YOU DO NOT WANT IT IN YOUR FAXED NOTE.

## 2024-11-11 NOTE — HPI: RASH
How Severe Is Your Rash?: mild
Is This A New Presentation, Or A Follow-Up?: Rash
Additional History: Pt

## 2024-11-19 ENCOUNTER — RX ONLY (RX ONLY)
Age: 69
End: 2024-11-19

## 2024-11-19 RX ORDER — DESOXIMETASONE 2.5 MG/G
CREAM TOPICAL
Qty: 60 | Refills: 2 | Status: ERX | COMMUNITY
Start: 2024-11-19

## 2024-11-21 ENCOUNTER — APPOINTMENT (OUTPATIENT)
Dept: URBAN - METROPOLITAN AREA CLINIC 295 | Age: 69
Setting detail: DERMATOLOGY
End: 2024-11-21

## 2024-11-21 PROBLEM — C44.712 BASAL CELL CARCINOMA OF SKIN OF RIGHT LOWER LIMB, INCLUDING HIP: Status: ACTIVE | Noted: 2024-11-21

## 2024-11-21 PROCEDURE — OTHER COUNSELING: OTHER

## 2024-11-21 PROCEDURE — 17261 DSTRJ MAL LES T/A/L .6-1.0CM: CPT

## 2024-11-21 PROCEDURE — OTHER CURETTAGE AND DESTRUCTION: OTHER

## 2024-11-21 NOTE — PROCEDURE: CURETTAGE AND DESTRUCTION
Detail Level: Detailed
Biopsy Photograph Reviewed: Yes
Number Of Curettages: 3
Size Of Lesion In Cm: 0.6
Size Of Lesion After Curettage: 1
Add Intralesional Injection: No
Concentration (Mg/Ml Or Millions Of Plaque Forming Units/Cc): 0.01
Anesthesia Type: 2% lidocaine without epinephrine and a 1:10 solution of 8.4% sodium bicarbonate
Cautery Type: electrodesiccation
What Was Performed First?: Curettage
Final Size Statement: The size of the lesion after curettage was
Additional Information: (Optional): The wound was cleaned, and a pressure dressing was applied.  The patient received detailed post-op instructions.
Consent: Verbal or written consent was obtained from the patient. The risks, benefits and alternatives to therapy were discussed in detail. Specifically, the risks of infection, scarring, bleeding, prolonged wound healing, nerve injury, incomplete removal, allergy to anesthesia and recurrence were addressed. Alternatives to ED&C, such as: surgical removal and XRT were also discussed.  Prior to the procedure, the treatment site was clearly identified and confirmed by the patient. All components of Universal Protocol/PAUSE Rule completed.
Post-Care Instructions: I reviewed with the patient in detail post-care instructions. Patient is to keep the area dry for 48 hours, and not to engage in any swimming until the area is healed. Should the patient develop any fevers, chills, bleeding, severe pain patient will contact the office immediately.
Bill As A Line Item Or As Units: Line Item

## 2024-12-09 ENCOUNTER — APPOINTMENT (OUTPATIENT)
Dept: URBAN - METROPOLITAN AREA CLINIC 295 | Age: 69
Setting detail: DERMATOLOGY
End: 2024-12-12

## 2024-12-09 DIAGNOSIS — L20.89 OTHER ATOPIC DERMATITIS: ICD-10-CM

## 2024-12-09 DIAGNOSIS — L81.7 PIGMENTED PURPURIC DERMATOSIS: ICD-10-CM

## 2024-12-09 DIAGNOSIS — T07XXXA INSECT BITE, NONVENOMOUS, OF OTHER, MULTIPLE, AND UNSPECIFIED SITES, WITHOUT MENTION OF INFECTION: ICD-10-CM

## 2024-12-09 PROBLEM — L30.9 DERMATITIS, UNSPECIFIED: Status: ACTIVE | Noted: 2024-12-09

## 2024-12-09 PROBLEM — S50.861A INSECT BITE (NONVENOMOUS) OF RIGHT FOREARM, INITIAL ENCOUNTER: Status: ACTIVE | Noted: 2024-12-09

## 2024-12-09 PROCEDURE — 99214 OFFICE O/P EST MOD 30 MIN: CPT

## 2024-12-09 PROCEDURE — OTHER PRESCRIPTION MEDICATION MANAGEMENT: OTHER

## 2024-12-09 PROCEDURE — OTHER MIPS QUALITY: OTHER

## 2024-12-09 PROCEDURE — OTHER COUNSELING: OTHER

## 2024-12-09 ASSESSMENT — LOCATION SIMPLE DESCRIPTION DERM
LOCATION SIMPLE: LEFT FOREARM
LOCATION SIMPLE: UPPER BACK
LOCATION SIMPLE: RIGHT PRETIBIAL REGION
LOCATION SIMPLE: LEFT PRETIBIAL REGION
LOCATION SIMPLE: RIGHT FOREARM

## 2024-12-09 ASSESSMENT — LOCATION DETAILED DESCRIPTION DERM
LOCATION DETAILED: LEFT PROXIMAL DORSAL FOREARM
LOCATION DETAILED: RIGHT DISTAL DORSAL FOREARM
LOCATION DETAILED: RIGHT DISTAL PRETIBIAL REGION
LOCATION DETAILED: INFERIOR THORACIC SPINE
LOCATION DETAILED: RIGHT PROXIMAL DORSAL FOREARM
LOCATION DETAILED: LEFT DISTAL PRETIBIAL REGION

## 2024-12-09 ASSESSMENT — LOCATION ZONE DERM
LOCATION ZONE: LEG
LOCATION ZONE: ARM
LOCATION ZONE: TRUNK

## 2024-12-09 NOTE — PROCEDURE: PRESCRIPTION MEDICATION MANAGEMENT
Detail Level: Zone
Samples Given: \\nOpzelura
Continue Regimen: \\nDesoximetasone
Render In Strict Bullet Format?: Yes
Initiate Treatment: \\nDesoximetasone cream AA BID PRN itch\\nOTC Rutin supplement or Arnica-containing lotion\\nCompression hose, exercise, leg elevation

## 2025-01-16 ENCOUNTER — RX ONLY (RX ONLY)
Age: 70
End: 2025-01-16

## 2025-01-16 RX ORDER — FLUCONAZOLE 150 MG/1
TABLET ORAL
Qty: 2 | Refills: 0 | Status: ERX

## 2025-02-10 ENCOUNTER — APPOINTMENT (OUTPATIENT)
Dept: URBAN - METROPOLITAN AREA CLINIC 295 | Age: 70
Setting detail: DERMATOLOGY
End: 2025-02-11

## 2025-02-10 ENCOUNTER — RX ONLY (RX ONLY)
Age: 70
End: 2025-02-10

## 2025-02-10 DIAGNOSIS — Z71.89 OTHER SPECIFIED COUNSELING: ICD-10-CM

## 2025-02-10 DIAGNOSIS — Z85.828 PERSONAL HISTORY OF OTHER MALIGNANT NEOPLASM OF SKIN: ICD-10-CM

## 2025-02-10 DIAGNOSIS — L20.89 OTHER ATOPIC DERMATITIS: ICD-10-CM

## 2025-02-10 DIAGNOSIS — L82.1 OTHER SEBORRHEIC KERATOSIS: ICD-10-CM

## 2025-02-10 DIAGNOSIS — L81.4 OTHER MELANIN HYPERPIGMENTATION: ICD-10-CM

## 2025-02-10 DIAGNOSIS — D18.0 HEMANGIOMA: ICD-10-CM

## 2025-02-10 DIAGNOSIS — Z85.820 PERSONAL HISTORY OF MALIGNANT MELANOMA OF SKIN: ICD-10-CM

## 2025-02-10 DIAGNOSIS — D22 MELANOCYTIC NEVI: ICD-10-CM

## 2025-02-10 PROBLEM — D22.5 MELANOCYTIC NEVI OF TRUNK: Status: ACTIVE | Noted: 2025-02-10

## 2025-02-10 PROBLEM — D18.01 HEMANGIOMA OF SKIN AND SUBCUTANEOUS TISSUE: Status: ACTIVE | Noted: 2025-02-10

## 2025-02-10 PROCEDURE — 99213 OFFICE O/P EST LOW 20 MIN: CPT

## 2025-02-10 PROCEDURE — OTHER SUNSCREEN RECOMMENDATIONS: OTHER

## 2025-02-10 PROCEDURE — OTHER COUNSELING: OTHER

## 2025-02-10 PROCEDURE — OTHER REASSURANCE: OTHER

## 2025-02-10 PROCEDURE — OTHER INVENTORY: OTHER

## 2025-02-10 PROCEDURE — OTHER MIPS QUALITY: OTHER

## 2025-02-10 RX ORDER — DESOXIMETASONE 2.5 MG/G
CREAM TOPICAL
Qty: 60 | Refills: 2 | Status: ERX | COMMUNITY
Start: 2025-02-10

## 2025-02-10 ASSESSMENT — LOCATION DETAILED DESCRIPTION DERM
LOCATION DETAILED: RIGHT DISTAL DORSAL FOREARM
LOCATION DETAILED: EPIGASTRIC SKIN
LOCATION DETAILED: LEFT MID-UPPER BACK
LOCATION DETAILED: INFERIOR MID FOREHEAD
LOCATION DETAILED: LEFT DISTAL DORSAL FOREARM
LOCATION DETAILED: RIGHT PROXIMAL PRETIBIAL REGION

## 2025-02-10 ASSESSMENT — LOCATION ZONE DERM
LOCATION ZONE: TRUNK
LOCATION ZONE: LEG
LOCATION ZONE: ARM
LOCATION ZONE: FACE

## 2025-02-10 ASSESSMENT — BSA RASH: BSA RASH: 3

## 2025-02-10 ASSESSMENT — ITCH NUMERIC RATING SCALE: HOW SEVERE IS YOUR ITCHING?: 3

## 2025-02-10 ASSESSMENT — LOCATION SIMPLE DESCRIPTION DERM
LOCATION SIMPLE: ABDOMEN
LOCATION SIMPLE: LEFT FOREARM
LOCATION SIMPLE: RIGHT PRETIBIAL REGION
LOCATION SIMPLE: LEFT UPPER BACK
LOCATION SIMPLE: RIGHT FOREARM
LOCATION SIMPLE: INFERIOR FOREHEAD

## 2025-02-10 NOTE — PROCEDURE: COUNSELING
Detail Level: Generalized
Sunscreen Recommendations: I recommend a broad-spectrum sunscreen with an SPF of 30 or higher. SPF 30 sunscreens block approximately 97 percent of the sun's harmful rays. Sunscreens should be applied at least 15 minutes prior to expected sun exposure and then every two hours after that at a minimum (or more frequently if it states in the packaging) as long as sun exposure continues. If swimming or exercising sunscreen should be reapplied every 45 minutes to an hour after getting wet or sweating. One ounce, or the equivalent of a shot glass full of sunscreen, is adequate to protect the skin of the entire body not covered by a bathing suit. I also recommend a lip balm with SPF 30 as well. Mineral-based sunscreens may be tolerated better in patients with sensitive skin and are recommended for anyone with a history of rosacea and/or melasma. Sun-protective clothing (rash guards, sun sleeves, gloves, sunglasses) can be used in lieu of sunscreen but must be worn the entire time you are exposed to the sun's rays.\\n\\nBrands of sunscreen that I like include CeraVe, La Roche-Posay Anthelios, Cetaphil, Vanicream, Neutrogena, Eucerin, Pipette, Blue Lizard, Elta MD, TIZO, Charan, Violet's Choice, Drunk Elephant, Supergoop!, Coola, ALIREZA, Cotz Plus, Avene, Adelina, ZO Skin Health, Obagi MD, FARRUKH, Skinceuticals
Detail Level: Detailed
Detail Level: Simple

## 2025-06-04 ENCOUNTER — APPOINTMENT (OUTPATIENT)
Dept: URBAN - METROPOLITAN AREA CLINIC 295 | Age: 70
Setting detail: DERMATOLOGY
End: 2025-06-04

## 2025-06-04 DIAGNOSIS — B36.0 PITYRIASIS VERSICOLOR: ICD-10-CM

## 2025-06-04 DIAGNOSIS — L60.3 NAIL DYSTROPHY: ICD-10-CM

## 2025-06-04 PROCEDURE — OTHER MIPS QUALITY: OTHER

## 2025-06-04 PROCEDURE — OTHER PRESCRIPTION: OTHER

## 2025-06-04 PROCEDURE — OTHER COUNSELING: OTHER

## 2025-06-04 PROCEDURE — 99214 OFFICE O/P EST MOD 30 MIN: CPT

## 2025-06-04 PROCEDURE — OTHER PRESCRIPTION MEDICATION MANAGEMENT: OTHER

## 2025-06-04 RX ORDER — KETOCONAZOLE 20 MG/ML
SHAMPOO, SUSPENSION TOPICAL
Qty: 240 | Refills: 11 | Status: ERX

## 2025-06-04 RX ORDER — FLUCONAZOLE 150 MG/1
TABLET ORAL
Qty: 12 | Refills: 1 | Status: ERX | COMMUNITY
Start: 2025-06-04

## 2025-06-04 RX ORDER — MINOXIDIL 2.5 MG/1
TABLET ORAL
Qty: 45 | Refills: 0 | Status: ERX | COMMUNITY
Start: 2025-06-04

## 2025-08-28 ENCOUNTER — RX ONLY (RX ONLY)
Age: 70
End: 2025-08-28

## 2025-08-28 ENCOUNTER — APPOINTMENT (OUTPATIENT)
Dept: URBAN - METROPOLITAN AREA CLINIC 295 | Age: 70
Setting detail: DERMATOLOGY
End: 2025-08-29

## 2025-08-28 DIAGNOSIS — B36.0 PITYRIASIS VERSICOLOR: ICD-10-CM

## 2025-08-28 DIAGNOSIS — L82.0 INFLAMED SEBORRHEIC KERATOSIS: ICD-10-CM

## 2025-08-28 DIAGNOSIS — L20.89 OTHER ATOPIC DERMATITIS: ICD-10-CM

## 2025-08-28 DIAGNOSIS — L72.0 EPIDERMAL CYST: ICD-10-CM

## 2025-08-28 PROCEDURE — 99214 OFFICE O/P EST MOD 30 MIN: CPT | Mod: 25

## 2025-08-28 PROCEDURE — OTHER DEFER: OTHER

## 2025-08-28 PROCEDURE — OTHER PRESCRIPTION: OTHER

## 2025-08-28 PROCEDURE — OTHER PRESCRIPTION MEDICATION MANAGEMENT: OTHER

## 2025-08-28 PROCEDURE — OTHER MIPS QUALITY: OTHER

## 2025-08-28 PROCEDURE — OTHER COUNSELING: OTHER

## 2025-08-28 PROCEDURE — 17110 DESTRUCT B9 LESION 1-14: CPT

## 2025-08-28 PROCEDURE — OTHER DIAGNOSIS COMMENT: OTHER

## 2025-08-28 PROCEDURE — OTHER LIQUID NITROGEN: OTHER

## 2025-08-28 RX ORDER — FLUCONAZOLE 150 MG/1
TABLET ORAL
Qty: 12 | Refills: 1 | Status: ERX

## 2025-08-28 RX ORDER — DESOXIMETASONE 2.5 MG/G
CREAM TOPICAL
Qty: 60 | Refills: 2 | Status: ERX

## 2025-08-28 RX ORDER — TACROLIMUS 1 MG/G
OINTMENT TOPICAL
Qty: 60 | Refills: 5 | Status: ERX

## 2025-08-28 ASSESSMENT — LOCATION DETAILED DESCRIPTION DERM
LOCATION DETAILED: LEFT SUPERIOR MEDIAL MIDBACK
LOCATION DETAILED: RIGHT LATERAL ABDOMEN
LOCATION DETAILED: LEFT MID-UPPER BACK
LOCATION DETAILED: LEFT RIB CAGE
LOCATION DETAILED: LEFT SUPERIOR LATERAL LOWER BACK
LOCATION DETAILED: LEFT MEDIAL UPPER BACK
LOCATION DETAILED: LEFT SUPERIOR UPPER BACK
LOCATION DETAILED: RIGHT SUPERIOR UPPER BACK
LOCATION DETAILED: LEFT INFERIOR UPPER BACK
LOCATION DETAILED: RIGHT INFERIOR UPPER BACK

## 2025-08-28 ASSESSMENT — LOCATION ZONE DERM: LOCATION ZONE: TRUNK

## 2025-08-28 ASSESSMENT — LOCATION SIMPLE DESCRIPTION DERM
LOCATION SIMPLE: LEFT UPPER BACK
LOCATION SIMPLE: RIGHT UPPER BACK
LOCATION SIMPLE: LEFT LOWER BACK
LOCATION SIMPLE: ABDOMEN

## 2025-08-28 ASSESSMENT — ITCH NUMERIC RATING SCALE: HOW SEVERE IS YOUR ITCHING?: 6
